# Patient Record
Sex: FEMALE | Race: WHITE | NOT HISPANIC OR LATINO | Employment: FULL TIME | URBAN - METROPOLITAN AREA
[De-identification: names, ages, dates, MRNs, and addresses within clinical notes are randomized per-mention and may not be internally consistent; named-entity substitution may affect disease eponyms.]

---

## 2019-10-02 ENCOUNTER — TRANSFERRED RECORDS (OUTPATIENT)
Dept: HEALTH INFORMATION MANAGEMENT | Facility: CLINIC | Age: 49
End: 2019-10-02

## 2019-10-23 ENCOUNTER — TRANSFERRED RECORDS (OUTPATIENT)
Dept: HEALTH INFORMATION MANAGEMENT | Facility: CLINIC | Age: 49
End: 2019-10-23

## 2020-01-22 ENCOUNTER — TRANSFERRED RECORDS (OUTPATIENT)
Dept: HEALTH INFORMATION MANAGEMENT | Facility: CLINIC | Age: 50
End: 2020-01-22

## 2020-11-13 ENCOUNTER — TRANSFERRED RECORDS (OUTPATIENT)
Dept: HEALTH INFORMATION MANAGEMENT | Facility: CLINIC | Age: 50
End: 2020-11-13

## 2020-11-22 ENCOUNTER — TRANSFERRED RECORDS (OUTPATIENT)
Dept: HEALTH INFORMATION MANAGEMENT | Facility: CLINIC | Age: 50
End: 2020-11-22

## 2020-12-07 ENCOUNTER — TRANSFERRED RECORDS (OUTPATIENT)
Dept: HEALTH INFORMATION MANAGEMENT | Facility: CLINIC | Age: 50
End: 2020-12-07

## 2020-12-11 ENCOUNTER — TRANSFERRED RECORDS (OUTPATIENT)
Dept: HEALTH INFORMATION MANAGEMENT | Facility: CLINIC | Age: 50
End: 2020-12-11

## 2020-12-14 ENCOUNTER — TRANSCRIBE ORDERS (OUTPATIENT)
Dept: OTHER | Age: 50
End: 2020-12-14

## 2020-12-14 DIAGNOSIS — N85.02 EIN (ENDOMETRIAL INTRAEPITHELIAL NEOPLASIA): Primary | ICD-10-CM

## 2020-12-15 ENCOUNTER — PATIENT OUTREACH (OUTPATIENT)
Dept: ONCOLOGY | Facility: CLINIC | Age: 50
End: 2020-12-15

## 2020-12-15 NOTE — PROGRESS NOTES
New Patient Oncology Nurse Navigator Note     Referring provider: Tara Lomax MD    Referring Clinic/Organization: Other - [facility]     Referred to: Gyn/Onc    Requested provider (if applicable): First available - did not specify . Prefers [place].    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 Followed up with endometrial biopsy done 12/7/20. Pathology showed endometrial intraepithelial neoplasia.     Clinical Assessment / Barriers to Care (Per Nurse):    Spoke with patient, no further questions at this time, ready for scheduling.       Records Location: [facility] Everywhere   Faxed - Media tab/Scanned     Records Needed:     None    Additional testing needed prior to consult:     None    Referral updates and Plan:     Referred on for scheduling.      Patria Tatum, RN, BSN, OCN    [facility]  [phone]

## 2020-12-16 ENCOUNTER — PRE VISIT (OUTPATIENT)
Dept: ONCOLOGY | Facility: CLINIC | Age: 50
End: 2020-12-16

## 2020-12-16 NOTE — TELEPHONE ENCOUNTER
ONCOLOGY INTAKE: Records Information      APPT INFORMATION:  Referring provider:  Dr. Lomax  Referring provider s clinic:  [facility]  Reason for visit/diagnosis:  EIN (endometrial intraepithelial neoplasia  Has patient been notified of appointment date and time?: yes    RECORDS INFORMATION:  Were the records received with the referral (via [employer])? no    Has patient been seen for any external appt for this diagnosis? yes    If yes, where? [facility]    Has patient had any imaging or procedures outside of [place]  [place] for this condition? yes      If Yes, where? [facility]    ADDITIONAL INFORMATION:  none

## 2020-12-17 NOTE — TELEPHONE ENCOUNTER
RECORDS STATUS - ALL OTHER DIAGNOSIS      RECORDS RECEIVED FROM: EIN (endometrial intraepithelial neoplasia   DATE RECEIVED: 12/17/2020   NOTES STATUS DETAILS   OFFICE NOTE from referring provider RECEIVED  DR ANTOINE FROM [facility]  12/11/2020   OFFICE NOTE from medical oncologist NA    DISCHARGE SUMMARY from hospital NA    DISCHARGE REPORT from the ER NA    OPERATIVE REPORT NA    MEDICATION LIST RECEIVED DR ANTOINE FROM [facility]  12/11/2020   CLINICAL TRIAL TREATMENTS TO DATE NA    LABS RECEIVED  [facility]   PATHOLOGY REPORTS RECEIVED  Report in chart 12/11/2020  Requesting slides  TRK:[id number]     ANYTHING RELATED TO DIAGNOSIS NA    GENONOMIC TESTING NA    TYPE:     IMAGING (NEED IMAGES & REPORT) In process    CT SCANS     MRI     MAMMO     ULTRASOUND     PET        Action    Action Taken SENT REQUEST TO [facility] FOR IMAGING AND REQUEST SLIDES FROM   [facility]

## 2020-12-18 NOTE — TELEPHONE ENCOUNTER
Nicole Song 12/18/20 1:48PM   Action Taken CSS received slides from [facility]- sent to Path dept 5th floor   CASE: [id number]

## 2020-12-22 PROCEDURE — 999N001032 HC STATISTIC REVIEW OUTSIDE SLIDES TC 88321: Performed by: OBSTETRICS & GYNECOLOGY

## 2020-12-22 PROCEDURE — 88321 CONSLTJ&REPRT SLD PREP ELSWR: CPT | Performed by: PATHOLOGY

## 2020-12-24 LAB — COPATH REPORT: NORMAL

## 2020-12-30 NOTE — TELEPHONE ENCOUNTER
Action    Action Taken 12/30/2020 2:48pm     I called [facility] to request IMG. Ph:  [phone] - They can't send image discs and they don't have any imaging on pt Mary Anne    I faxed over a request for IMG to [facility].     I called [facility]- her phone went to

## 2021-01-14 ENCOUNTER — ONCOLOGY VISIT (OUTPATIENT)
Dept: ONCOLOGY | Facility: CLINIC | Age: 51
End: 2021-01-14
Attending: OBSTETRICS & GYNECOLOGY
Payer: COMMERCIAL

## 2021-01-14 VITALS
BODY MASS INDEX: 39.67 KG/M2 | TEMPERATURE: 98.9 F | RESPIRATION RATE: 14 BRPM | WEIGHT: 215.6 LBS | HEIGHT: 62 IN | HEART RATE: 85 BPM | SYSTOLIC BLOOD PRESSURE: 109 MMHG | OXYGEN SATURATION: 99 % | DIASTOLIC BLOOD PRESSURE: 71 MMHG

## 2021-01-14 DIAGNOSIS — N85.00 ENDOMETRIAL HYPERPLASIA: Primary | ICD-10-CM

## 2021-01-14 PROCEDURE — 99205 OFFICE O/P NEW HI 60 MIN: CPT | Performed by: OBSTETRICS & GYNECOLOGY

## 2021-01-14 RX ORDER — MEDROXYPROGESTERONE ACETATE 10 MG
10 TABLET ORAL DAILY
COMMUNITY
Start: 2021-01-07 | End: 2024-05-23

## 2021-01-14 SDOH — HEALTH STABILITY: MENTAL HEALTH: HOW MANY STANDARD DRINKS CONTAINING ALCOHOL DO YOU HAVE ON A TYPICAL DAY?: 1 OR 2

## 2021-01-14 SDOH — HEALTH STABILITY: MENTAL HEALTH: HOW OFTEN DO YOU HAVE 6 OR MORE DRINKS ON ONE OCCASION?: NEVER

## 2021-01-14 SDOH — HEALTH STABILITY: MENTAL HEALTH: HOW OFTEN DO YOU HAVE A DRINK CONTAINING ALCOHOL?: MONTHLY OR LESS

## 2021-01-14 ASSESSMENT — PAIN SCALES - GENERAL: PAINLEVEL: NO PAIN (0)

## 2021-01-14 ASSESSMENT — MIFFLIN-ST. JEOR: SCORE: 1541.21

## 2021-01-14 NOTE — PROGRESS NOTES
"                        Consult Notes on Referred Patient    Date: 2021       Dr. Tara Lomax MD  [facility]  [address] [place] [address]       RE: Mary Anne Taylor  : 1970  LONI: 2021    Dear Dr. Tara Lomax:    I had the pleasure of seeing your patient Mary Anne Taylor here at the Gynecologic Cancer Clinic at the [facility] on 2021.  As you know she is a very pleasant 50 year old woman with a recent diagnosis of EIN.  Given these findings she was subsequently sent to the Gynecologic Cancer Clinic for new patient consultation.     Ms. Taylor is perimenopausal and presented in 10/2020 having had spotting after 6 months of amenorrhea.  During the evaluation of this she has a pap which demonstrated endometrial cells and a subsequent a biopsy which demonstrated EIN.      Her history is notable for a an DEBORAH pap in  (HPV neg) and ECC and EMbx at that time were WNL with the exception of some \"glandular crowding\". A follow-up biopsy was recommended and performed (2020) which was normal per report.        Review of Systems:    Systemic           no weight changes; no fever; no chills; no night sweats; no appetite changes  Skin           no rashes, or lesions  Eye           no irritation; no changes in vision  Clinton-Laryngeal           no dysphagia; no hoarseness   Pulmonary    no cough; no shortness of breath  Cardiovascular    no chest pain; no palpitations  Gastrointestinal    no diarrhea; no constipation; no abdominal pain; no changes in bowel  habits; no blood in stool  Genitourinary   no urinary frequency; no urinary urgency; no dysuria; no pain; no abnormal vaginal discharge; no abnormal vaginal bleeding  Breast   no breast discharge; no breast changes; no breast pain  Musculoskeletal    no myalgias; no arthralgias; no back pain  Psychiatric           no depressed mood; no anxiety    Hematologic           no tender lymph nodes; no " "noticeable swellings or lumps   Endocrine    no hot flashes; no heat/cold intolerance         Neurological   no tremor; no numbness and tingling; no headaches; no difficulty  sleeping      Past Medical History:    Colon polyps  Obesity  Elevated TSH (mild)    Past Surgical History:    Tubal liagtion    Health Maintenance:  Health Maintenance Due   Topic Date Due     PREVENTIVE CARE VISIT  1970     MAMMO SCREENING  1968     COLORECTAL CANCER SCREENING  1978     HIV SCREENING  1983     HEPATITIS C SCREENING  1986     DTAP/TDAP/TD IMMUNIZATION (1 - Tdap) 1993     LIPID  2013     ZOSTER IMMUNIZATION (1 of 2) 2018     PHQ-2  2021       Last Pap Smear: See above; also h/o KAYE 1 paps    Last Mammogram: 2019 BIRADS 1    Last Colonoscopy:  normal per report      Current Medications:     currently has no medications in their medication list.       Allergies:      Not on File         Social History:     Social History     Tobacco Use     Smoking status: Not on file   Substance Use Topics     Alcohol use: Not on file       History   Drug Use Not on file           Family History:     The patient's family history is notable for .    Father with colon CA (deceeased at age 59), brother with testis CA ( at age 51)    Physical Exam:     PS 0  VS: /71 (BP Location: Right arm)   Pulse 85   Temp 98.9  F (37.2  C) (Oral)   Resp 14   Ht 1.575 m (5' 2\")   Wt 97.8 kg (215 lb 9.6 oz)   SpO2 99%   BMI 39.43 kg/m       General Appearance: healthy and alert, no distress     HEENT:  no thyromegaly, no palpable nodules or masses        Cardiovascular: regular rate and rhythm, no gallops, rubs or murmurs     Respiratory: lungs clear, no rales, rhonchi or wheezes, normal diaphragmatic excursion    Musculoskeletal: extremities non tender and without edema    Skin: no lesions or rashes     Neurological: normal gait, no gross defects     Psychiatric: appropriate mood and " affect                               Hematological: normal cervical, supraclavicular and inguinal lymph nodes     Gastrointestinal:       abdomen soft, non-tender, non-distended, no organomegaly or masses    Genitourinary: External genitalia and urethral meatus appears normal.  Vagina is smooth without nodularity or masses.  Cervix appears normal and without lesions.  Bimanual exam reveal no masses, nodularity or fullness.  Recto-vaginal exam confirms these findings.      Assessment:    Mary Anne Taylor is a 50 year old woman with a new diagnosis of EIN.     A total of 60 minutes was spent with the patient, 40 minutes of which were spent in counseling the patient and/or treatment planning.      Plan:     1.)   EIN - CAH: we have discussed the pathologic an clinical findings and she is aware of the potential for occult malignancy.  We have discussed options including medical and surgical management. After a comprehensive discussion of the relative risks and benefits of each strategy she is inclined to consider her options further prior to making a definitive decision.  This is reasonable.  She will continue to take her progestin and will make a plan to F/U in 3 months for a biopsy on the assumption that she elects away from surgery.  She is aware she may return at any point should her symptoms worsen or she simply changes her mind.  She is not a candidate for the exemestane study owing to her previous exposure to progestins.  I have discussed the potential risks and benefits of ovarian removal at the time of hysterectomy and she is inclined to TLH/BSO, with staging as indicated by the intra-operative findings.      2.) Genetic risk factors were assessed and the patient does not meet the qualifications for a referral.      3.) Labs and/or tests ordered include:  Pre-operative labs.     4.) Health maintenance issues addressed today include none.          Thank you for allowing us to participate in the care of your  patient.         Sincerely,    Wilfredo Landeros MD      CC  No care team member to display  ROSIO ANTOINE

## 2021-01-14 NOTE — NURSING NOTE
"Oncology Rooming Note    January 14, 2021 12:12 PM   Mary Anne Taylor is a 50 year old female who presents for:    Chief Complaint   Patient presents with     Oncology Clinic Visit     New Patient     Initial Vitals: /71 (BP Location: Right arm)   Pulse 85   Temp 98.9  F (37.2  C) (Oral)   Resp 14   Ht 1.575 m (5' 2\")   Wt 97.8 kg (215 lb 9.6 oz)   SpO2 99%   BMI 39.43 kg/m   Estimated body mass index is 39.43 kg/m  as calculated from the following:    Height as of this encounter: 1.575 m (5' 2\").    Weight as of this encounter: 97.8 kg (215 lb 9.6 oz). Body surface area is 2.07 meters squared.  No Pain (0) Comment: Data Unavailable   No LMP recorded.  Allergies reviewed: Yes  Medications reviewed: Yes    Medications: Medication refills not needed today.  Pharmacy name entered into [employer]: [id number] [address]    Clinical concerns: None       Kina Munoz CMA            "

## 2021-01-14 NOTE — PATIENT INSTRUCTIONS
Follow-up with Dr. Landeros in 3 months with endometrial biopsy.      Follow-up sooner with any worsening symptoms, or if you decide to proceed with surgery.

## 2021-01-15 ENCOUNTER — HEALTH MAINTENANCE LETTER (OUTPATIENT)
Age: 51
End: 2021-01-15

## 2021-04-21 PROBLEM — F17.200 TOBACCO USE DISORDER: Status: ACTIVE | Noted: 2017-11-03

## 2021-04-21 PROBLEM — E03.8 SUBCLINICAL HYPOTHYROIDISM: Status: ACTIVE | Noted: 2020-11-21

## 2021-04-21 PROBLEM — Z98.51 STATUS POST TUBAL LIGATION: Status: ACTIVE | Noted: 2017-11-03

## 2021-04-21 NOTE — PROGRESS NOTES
"                        Consult Notes on Referred Patient    Dr. Tara Lomax MD  [facility]  [address] [place] [address]       RE: Mary Anne Taylor  : 1970  LONI: 2021     Dear Dr. Tara Lomax:    I had the pleasure of seeing your patient Mary Anne Taylor here at the Gynecologic Cancer Clinic at the [facility] on 2021.  As you know she is a very pleasant 50 year old woman with a recent diagnosis of EIN.  Given these findings she was subsequently sent to the Gynecologic Cancer Clinic for new patient consultation.     Ms. Taylor is perimenopausal and presented in 10/2020 having had spotting after 6 months of amenorrhea.  During the evaluation of this she has a pap which demonstrated endometrial cells and a subsequent a biopsy which demonstrated EIN.      Her history is notable for a an DEBORAH pap in  (HPV neg) and ECC and EMbx at that time were WNL with the exception of some \"glandular crowding\". A follow-up biopsy was recommended and performed (2020) which was normal per report.      She elected to procedure with progesterone therapy which she took daily for 2 months but has been off for about a month at this point \"I thought that was all I should take.\"  She presents in follow-up and reports no interval bleeding.  She has not been sexually active during this time      Review of Systems:    Systemic           no weight changes; no fever; no chills; no night sweats; no appetite changes  Skin           no rashes, or lesions  Eye           no irritation; no changes in vision  Clinton-Laryngeal           no dysphagia; no hoarseness   Pulmonary    no cough; no shortness of breath  Cardiovascular    no chest pain; no palpitations  Gastrointestinal    no diarrhea; no constipation; no abdominal pain; no changes in bowel  habits; no blood in stool  Genitourinary   no urinary frequency; no urinary urgency; no dysuria; no pain; no abnormal vaginal " "discharge; no abnormal vaginal bleeding  Breast   no breast discharge; no breast changes; no breast pain  Musculoskeletal    no myalgias; no arthralgias; no back pain  Psychiatric           no depressed mood; no anxiety    Hematologic           no tender lymph nodes; no noticeable swellings or lumps   Endocrine    no hot flashes; no heat/cold intolerance         Neurological   no tremor; no numbness and tingling; no headaches; no difficulty  sleeping      Past Medical History:    Colon polyps  Obesity  Elevated TSH (mild)    Past Surgical History:    Tubal liagtion    Health Maintenance:  Health Maintenance Due   Topic Date Due     PREVENTIVE CARE VISIT  Never done     ADVANCE CARE PLANNING  Never done     MAMMO SCREENING  Never done     COLORECTAL CANCER SCREENING  Never done     HIV SCREENING  Never done     COVID-19 Vaccine (1) Never done     HEPATITIS C SCREENING  Never done     LIPID  Never done     ZOSTER IMMUNIZATION (1 of 2) Never done     PHQ-2  Never done       Last Pap Smear: See above; also h/o KAYE 1 paps    Last Mammogram: 2019 BIRADS 1    Last Colonoscopy:  normal per report      Current Medications:     has a current medication list which includes the following prescription(s): medroxyprogesterone.       Allergies:      No Known Allergies         Social History:     Social History     Tobacco Use     Smoking status: Former Smoker     Smokeless tobacco: Never Used   Substance Use Topics     Alcohol use: Yes     Frequency: Monthly or less     Drinks per session: 1 or 2     Binge frequency: Never     Comment: mixed drinks only half a glass       History   Drug Use Unknown           Family History:     The patient's family history is notable for .    Father with colon CA (deceeased at age 59), brother with testis CA ( at age 51)    Physical Exam:     PS 0  VS: /76 (BP Location: Right arm)   Pulse 76   Temp 98.6  F (37  C) (Oral)   Resp 18   Ht 1.575 m (5' 2.01\")   Wt 91.9 kg (202 " lb 11.2 oz)   SpO2 98%   BMI 37.06 kg/m        General Appearance: healthy and alert, no distress     HEENT:  no thyromegaly, no palpable nodules or masses        Cardiovascular: regular rate and rhythm, no gallops, rubs or murmurs     Respiratory: lungs clear, no rales, rhonchi or wheezes, normal diaphragmatic excursion    Musculoskeletal: extremities non tender and without edema    Skin: no lesions or rashes     Neurological: normal gait, no gross defects     Psychiatric: appropriate mood and affect                               Hematological: normal cervical, supraclavicular and inguinal lymph nodes     Gastrointestinal:       abdomen soft, non-tender, non-distended, no organomegaly or masses    Genitourinary: External genitalia and urethral meatus appears normal.  Vagina is smooth without nodularity or masses.  Cervix appears normal and without lesions.  Bimanual exam reveal no masses, nodularity or fullness.  Recto-vaginal exam confirms these findings.      Procedure: Sterile Pipelle ndometrial biopsy performed without dilation, productive of a higher than expected tissue volume, patient tolerated the procedure well      Assessment:    Mary Anne Taylor is a 50 year old woman with a new diagnosis of EIN.     A total of 60 minutes was spent with the patient, 40 minutes of which were spent in counseling the patient and/or treatment planning.      Plan:     1.)   EIN - CAH: we have discussed the pathologic an clinical findings and she is aware of the potential for occult malignancy.  We have discussed options including medical and surgical management. She is inclined to wait for the current biopsy report prior to making a definitive decision, which is reasonable.  WE should have this back in about 1 week     2.) Genetic risk factors were assessed and the patient does not meet the qualifications for a referral.      3.) Labs and/or tests ordered include:  biopsy     4.) Health maintenance issues addressed today  include none.      Thank you for allowing us to participate in the care of your patient.         Sincerely,    Wilfredo Landeros MD      CC  Patient Care Team:  No Ref-Primary, Physician as PCP - General  Wilfredo Landeros MD as Assigned Cancer Care Provider  ROSIO ANTOINE

## 2021-04-22 ENCOUNTER — ONCOLOGY VISIT (OUTPATIENT)
Dept: ONCOLOGY | Facility: CLINIC | Age: 51
End: 2021-04-22
Attending: OBSTETRICS & GYNECOLOGY
Payer: COMMERCIAL

## 2021-04-22 VITALS
SYSTOLIC BLOOD PRESSURE: 114 MMHG | HEART RATE: 76 BPM | WEIGHT: 202.7 LBS | HEIGHT: 62 IN | DIASTOLIC BLOOD PRESSURE: 76 MMHG | RESPIRATION RATE: 18 BRPM | TEMPERATURE: 98.6 F | OXYGEN SATURATION: 98 % | BODY MASS INDEX: 37.3 KG/M2

## 2021-04-22 DIAGNOSIS — N85.00 ENDOMETRIAL HYPERPLASIA: ICD-10-CM

## 2021-04-22 PROCEDURE — 88305 TISSUE EXAM BY PATHOLOGIST: CPT | Performed by: OBSTETRICS & GYNECOLOGY

## 2021-04-22 PROCEDURE — 99214 OFFICE O/P EST MOD 30 MIN: CPT | Mod: 25 | Performed by: OBSTETRICS & GYNECOLOGY

## 2021-04-22 PROCEDURE — 58100 BIOPSY OF UTERUS LINING: CPT | Performed by: OBSTETRICS & GYNECOLOGY

## 2021-04-22 ASSESSMENT — MIFFLIN-ST. JEOR: SCORE: 1482.82

## 2021-04-22 ASSESSMENT — PAIN SCALES - GENERAL: PAINLEVEL: NO PAIN (0)

## 2021-04-22 NOTE — LETTER
"    2021         RE: Mary Anne Taylor  [address] [place] [address]        Dear Colleague,    Thank you for referring your patient, Mary Anne Taylor, to the [facility]. Please see a copy of my visit note below.                            Consult Notes on Referred Patient    [facility]  [address] [place] [address]       RE: Mary Anne Taylor  : 1970  LONI: 2021     Dear Dr. Tara Lomax:    I had the pleasure of seeing your patient Mary Anne Taylor here at the Gynecologic Cancer Clinic at the [facility] on 2021.  As you know she is a very pleasant 50 year old woman with a recent diagnosis of EIN.  Given these findings she was subsequently sent to the Gynecologic Cancer Clinic for new patient consultation.     Ms. Taylor is perimenopausal and presented in 10/2020 having had spotting after 6 months of amenorrhea.  During the evaluation of this she has a pap which demonstrated endometrial cells and a subsequent a biopsy which demonstrated EIN.      Her history is notable for a an DEBORAH pap in  (HPV neg) and ECC and EMbx at that time were WNL with the exception of some \"glandular crowding\". A follow-up biopsy was recommended and performed (2020) which was normal per report.      She elected to procedure with progesterone therapy which she took daily for 2 months but has been off for about a month at this point \"I thought that was all I should take.\"  She presents in follow-up and reports no interval bleeding.  She has not been sexually active during this time      Review of Systems:    Systemic           no weight changes; no fever; no chills; no night sweats; no appetite changes  Skin           no rashes, or lesions  Eye           no irritation; no changes in vision  Clinton-Laryngeal           no dysphagia; no hoarseness   Pulmonary    no cough; no shortness of " breath  Cardiovascular    no chest pain; no palpitations  Gastrointestinal    no diarrhea; no constipation; no abdominal pain; no changes in bowel  habits; no blood in stool  Genitourinary   no urinary frequency; no urinary urgency; no dysuria; no pain; no abnormal vaginal discharge; no abnormal vaginal bleeding  Breast   no breast discharge; no breast changes; no breast pain  Musculoskeletal    no myalgias; no arthralgias; no back pain  Psychiatric           no depressed mood; no anxiety    Hematologic           no tender lymph nodes; no noticeable swellings or lumps   Endocrine    no hot flashes; no heat/cold intolerance         Neurological   no tremor; no numbness and tingling; no headaches; no difficulty  sleeping      Past Medical History:    Colon polyps  Obesity  Elevated TSH (mild)    Past Surgical History:    Tubal liagtion    Health Maintenance:  Health Maintenance Due   Topic Date Due     PREVENTIVE CARE VISIT  Never done     ADVANCE CARE PLANNING  Never done     MAMMO SCREENING  Never done     COLORECTAL CANCER SCREENING  Never done     HIV SCREENING  Never done     COVID-19 Vaccine (1) Never done     HEPATITIS C SCREENING  Never done     LIPID  Never done     ZOSTER IMMUNIZATION (1 of 2) Never done     PHQ-2  Never done       Last Pap Smear: See above; also h/o KAYE 1 paps    Last Mammogram: 5/2019 BIRADS 1    Last Colonoscopy: 2018 normal per report      Current Medications:     has a current medication list which includes the following prescription(s): medroxyprogesterone.       Allergies:      No Known Allergies         Social History:     Social History     Tobacco Use     Smoking status: Former Smoker     Smokeless tobacco: Never Used   Substance Use Topics     Alcohol use: Yes     Frequency: Monthly or less     Drinks per session: 1 or 2     Binge frequency: Never     Comment: mixed drinks only half a glass       History   Drug Use Unknown           Family History:     The patient's family history  "is notable for .    Father with colon CA (deceeased at age 59), brother with testis CA ( at age 51)    Physical Exam:     PS 0  VS: /76 (BP Location: Right arm)   Pulse 76   Temp 98.6  F (37  C) (Oral)   Resp 18   Ht 1.575 m (5' 2.01\")   Wt 91.9 kg (202 lb 11.2 oz)   SpO2 98%   BMI 37.06 kg/m        General Appearance: healthy and alert, no distress     HEENT:  no thyromegaly, no palpable nodules or masses        Cardiovascular: regular rate and rhythm, no gallops, rubs or murmurs     Respiratory: lungs clear, no rales, rhonchi or wheezes, normal diaphragmatic excursion    Musculoskeletal: extremities non tender and without edema    Skin: no lesions or rashes     Neurological: normal gait, no gross defects     Psychiatric: appropriate mood and affect                               Hematological: normal cervical, supraclavicular and inguinal lymph nodes     Gastrointestinal:       abdomen soft, non-tender, non-distended, no organomegaly or masses    Genitourinary: External genitalia and urethral meatus appears normal.  Vagina is smooth without nodularity or masses.  Cervix appears normal and without lesions.  Bimanual exam reveal no masses, nodularity or fullness.  Recto-vaginal exam confirms these findings.      Procedure: Sterile Pipelle ndometrial biopsy performed without dilation, productive of a higher than expected tissue volume, patient tolerated the procedure well      Assessment:    Mary Anne Taylor is a 50 year old woman with a new diagnosis of EIN.     A total of 60 minutes was spent with the patient, 40 minutes of which were spent in counseling the patient and/or treatment planning.      Plan:     1.)   EIN - CAH: we have discussed the pathologic an clinical findings and she is aware of the potential for occult malignancy.  We have discussed options including medical and surgical management. She is inclined to wait for the current biopsy report prior to making a definitive decision, " which is reasonable.  WE should have this back in about 1 week     2.) Genetic risk factors were assessed and the patient does not meet the qualifications for a referral.      3.) Labs and/or tests ordered include:  biopsy     4.) Health maintenance issues addressed today include none.      Thank you for allowing us to participate in the care of your patient.         Sincerely,    Wilfredo Landeros MD        Patient Care Team:  No Ref-Primary, Physician as PCP - General  Wilfredo Landeros MD as Assigned Cancer Care Provider  ROSIO ANTOINE        Again, thank you for allowing me to participate in the care of your patient.        Sincerely,        Wilfredo Landeros MD

## 2021-04-22 NOTE — PATIENT INSTRUCTIONS
Endometrial biopsy was done today, we will notify you when the results come in.  Follow up based on results.

## 2021-04-27 LAB — COPATH REPORT: NORMAL

## 2021-04-29 ENCOUNTER — TELEPHONE (OUTPATIENT)
Dept: ONCOLOGY | Facility: CLINIC | Age: 51
End: 2021-04-29

## 2021-04-29 NOTE — TELEPHONE ENCOUNTER
"Wants to know:  - biopsy results from 4/22/21  - if she is reading the MyChart results correctly that say \"negative for malignancy.\"  - what to do from here. Three month follow up as previously discussed?    Paged Dr Gallagher whom states:  -every thing is good. No concerns  -follow up in 6 months with biopsy    Informed patient of results and plan of care.  Patient grateful for these results.  Transferred patient to  to schedule 6 month appointment.  Informed patient to contact us sooner than 6 months if any new, or worsening, concerns arise.    Yadira Lockwood RN on 4/29/2021 at 2:07 PM    "

## 2021-10-11 ENCOUNTER — HEALTH MAINTENANCE LETTER (OUTPATIENT)
Age: 51
End: 2021-10-11

## 2021-11-09 ENCOUNTER — ONCOLOGY VISIT (OUTPATIENT)
Dept: ONCOLOGY | Facility: CLINIC | Age: 51
End: 2021-11-09
Payer: COMMERCIAL

## 2021-11-09 VITALS
RESPIRATION RATE: 16 BRPM | HEIGHT: 62 IN | SYSTOLIC BLOOD PRESSURE: 122 MMHG | BODY MASS INDEX: 36.22 KG/M2 | HEART RATE: 67 BPM | DIASTOLIC BLOOD PRESSURE: 75 MMHG | TEMPERATURE: 98.7 F | OXYGEN SATURATION: 98 % | WEIGHT: 196.8 LBS

## 2021-11-09 DIAGNOSIS — N85.00 ENDOMETRIAL HYPERPLASIA: Primary | ICD-10-CM

## 2021-11-09 PROCEDURE — 58100 BIOPSY OF UTERUS LINING: CPT | Performed by: OBSTETRICS & GYNECOLOGY

## 2021-11-09 PROCEDURE — 99213 OFFICE O/P EST LOW 20 MIN: CPT | Mod: 25 | Performed by: OBSTETRICS & GYNECOLOGY

## 2021-11-09 PROCEDURE — 88305 TISSUE EXAM BY PATHOLOGIST: CPT | Performed by: PATHOLOGY

## 2021-11-09 ASSESSMENT — MIFFLIN-ST. JEOR: SCORE: 1451.09

## 2021-11-09 ASSESSMENT — PAIN SCALES - GENERAL: PAINLEVEL: NO PAIN (0)

## 2021-11-09 NOTE — PROGRESS NOTES
"Gynecologic Oncology Clinic - Established Patient Visit    Visit date: Nov 9, 2021     CC: f/u EIN    Interval history: Mary Anne Taylor is a 51 year old pre-menopausal female with past history of EIN treated with short course of oral progestin who returns for follow-up. Her last biopsy in 4/2021 was negative for hyperplasia. She continues to have regular menses. The longest she has gone without bleeding is 2 months. No inter-menstrual bleeding. She is still contemplating hysterectomy. She is prepared for repeat biopsy today.       Review of Systems:  As per HPI, otherwise non-contributory.     Past Medical History:  Past Medical History:   Diagnosis Date     Adenomatous polyp      Hypothyroidism        Past Surgical History:  Past Surgical History:   Procedure Laterality Date     TUBAL LIGATION          Physical Exam:  /75 (BP Location: Right arm, Patient Position: Sitting, Cuff Size: Adult Large)   Pulse 67   Temp 98.7  F (37.1  C) (Oral)   Resp 16   Ht 1.575 m (5' 2.01\")   Wt 89.3 kg (196 lb 12.8 oz)   SpO2 98%   BMI 35.98 kg/m     General appearance: no acute distress, well groomed, sitting comfortably   :  External: vulva/labia normal in appearance without lesions  Vagina: mucosa is pink, no lesions appreciated  Cervix: normal appearing without lesions  Uterus/adnexa: Normal sized    Endometrial biopsy  Cervix visualized. Attempted to pass pipelle. Single-tooth tenaculum placed on anterior lip, pipelle passed through cervix. Biopsy obtained with moderate cramping. Minimal tissue. Will await results.     Labs/Pathology:  Pending  Reviewed [facility] pathology [facility] endometrial biopsy.    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    Plan:  - Repeat endometrial biopsy obtained today. Will make follow-up " plan pending results.We did discuss option of hysterectomy given her history of EIN, but I would want to ensure pre-surgical insurance approval in that case.    In addition to biopsy I spent a total of 15 min in discussion of her history and options for management.     Jass Dodge MD     Gynecologic Oncology

## 2021-11-09 NOTE — NURSING NOTE
"Oncology Rooming Note    November 9, 2021 2:28 PM   Mary Anne Taylor is a 51 year old female who presents for:    Chief Complaint   Patient presents with     Oncology Clinic Visit     6 month follow up exam/repeat biopsy     Initial Vitals: /75 (BP Location: Right arm, Patient Position: Sitting, Cuff Size: Adult Large)   Pulse 67   Temp 98.7  F (37.1  C) (Oral)   Resp 16   Ht 1.575 m (5' 2.01\")   Wt 89.3 kg (196 lb 12.8 oz)   SpO2 98%   BMI 35.98 kg/m   Estimated body mass index is 35.98 kg/m  as calculated from the following:    Height as of this encounter: 1.575 m (5' 2.01\").    Weight as of this encounter: 89.3 kg (196 lb 12.8 oz). Body surface area is 1.98 meters squared.  No Pain (0) Comment: Data Unavailable   No LMP recorded. Patient is postmenopausal.  Allergies reviewed: Yes  Medications reviewed: Yes    Medications: Medication refills not needed today.  Pharmacy name entered into [employer]: [id number] [place] [facility] [place], [place] - [address]    Clinical concerns:  Irregular vaginal bleeding, no bleeding from June to August and then off/on bleeding Sept/Oct  Dr. Dodge advised      Kina Munoz CMA            "

## 2021-11-09 NOTE — LETTER
"    11/9/2021         RE: Mary Anne Taylor  [address] [facility] [address] [facility] [address]        Dear Colleague,    Thank you for referring your patient, Mary Anne Taylor, to the [facility]. Please see a copy of my visit note below.    Gynecologic Oncology Clinic - Established Patient Visit    Visit date: Nov 9, 2021     CC: f/u EIN    Interval history: Mary Anne Taylor is a 51 year old pre-menopausal female with past history of EIN treated with short course of oral progestin who returns for follow-up. Her last biopsy in 4/2021 was negative for hyperplasia. She continues to have regular menses. The longest she has gone without bleeding is 2 months. No inter-menstrual bleeding. She is still contemplating hysterectomy. She is prepared for repeat biopsy today.       Review of Systems:  As per HPI, otherwise non-contributory.     Past Medical History:  Past Medical History:   Diagnosis Date     Adenomatous polyp      Hypothyroidism        Past Surgical History:  Past Surgical History:   Procedure Laterality Date     TUBAL LIGATION          Physical Exam:  /75 (BP Location: Right arm, Patient Position: Sitting, Cuff Size: Adult Large)   Pulse 67   Temp 98.7  F (37.1  C) (Oral)   Resp 16   Ht 1.575 m (5' 2.01\")   Wt 89.3 kg (196 lb 12.8 oz)   SpO2 98%   BMI 35.98 kg/m     General appearance: no acute distress, well groomed, sitting comfortably   :  External: vulva/labia normal in appearance without lesions  Vagina: mucosa is pink, no lesions appreciated  Cervix: normal appearing without lesions  Uterus/adnexa: Normal sized    Endometrial biopsy  Cervix visualized. Attempted to pass pipelle. Single-tooth tenaculum placed on anterior lip, pipelle passed through cervix. Biopsy obtained with moderate cramping. Minimal tissue. Will await results.     Labs/Pathology:  Pending  Reviewed [facility] pathology [facility] endometrial biopsy.    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    Plan:  - Repeat endometrial biopsy obtained today. Will make follow-up plan pending results.We did discuss option of hysterectomy given her history of EIN, but I would want to ensure pre-surgical insurance approval in that case.    In addition to biopsy I spent a total of 15 min in discussion of her history and options for management.     Jass Dodge MD     Gynecologic Oncology         Again, thank you for allowing me to participate in the care of your patient.        Sincerely,        Jass Dodge MD

## 2021-11-16 LAB
PATH REPORT.COMMENTS IMP SPEC: NORMAL
PATH REPORT.COMMENTS IMP SPEC: NORMAL
PATH REPORT.FINAL DX SPEC: NORMAL
PATH REPORT.GROSS SPEC: NORMAL
PATH REPORT.MICROSCOPIC SPEC OTHER STN: NORMAL
PATH REPORT.RELEVANT HX SPEC: NORMAL
PHOTO IMAGE: NORMAL

## 2022-01-30 ENCOUNTER — HEALTH MAINTENANCE LETTER (OUTPATIENT)
Age: 52
End: 2022-01-30

## 2022-09-24 ENCOUNTER — HEALTH MAINTENANCE LETTER (OUTPATIENT)
Age: 52
End: 2022-09-24

## 2023-01-30 ENCOUNTER — HEALTH MAINTENANCE LETTER (OUTPATIENT)
Age: 53
End: 2023-01-30

## 2023-05-08 ENCOUNTER — HEALTH MAINTENANCE LETTER (OUTPATIENT)
Age: 53
End: 2023-05-08

## 2023-05-21 NOTE — NURSING NOTE
"Oncology Rooming Note    April 22, 2021 11:10 AM   Mary Anne Taylor is a 50 year old female who presents for:    Chief Complaint   Patient presents with     Oncology Clinic Visit     3 month follow up     Initial Vitals: /76 (BP Location: Right arm)   Pulse 76   Temp 98.6  F (37  C) (Oral)   Resp 18   Ht 1.575 m (5' 2.01\")   Wt 91.9 kg (202 lb 11.2 oz)   SpO2 98%   BMI 37.06 kg/m   Estimated body mass index is 37.06 kg/m  as calculated from the following:    Height as of this encounter: 1.575 m (5' 2.01\").    Weight as of this encounter: 91.9 kg (202 lb 11.2 oz). Body surface area is 2.01 meters squared.  No Pain (0) Comment: Data Unavailable   No LMP recorded. Patient is postmenopausal.  Allergies reviewed: Yes  Medications reviewed: Yes    Medications: Medication refills not needed today.  Pharmacy name entered into "[employer]: [id number] [place] [facility] [place] - [address]      Alecia Moss LPN              " [facility] Medicine  Discharge Summary      Patient Name: Earl Abdul  MRN: [id number]  IZA: [id number]  Patient Class: IP- Inpatient  Admission Date: 5/15/2023  Hospital Length of Stay: 6 days  Discharge Date and Time:  05/21/2023 12:54 PM  Attending Physician: Luis Smith MD   Discharging Provider: King Wallace MD  Primary Care Provider: Andrew Rodriguez MD  Hospital Medicine Team: [facility] 1 King Wallace MD  Primary Care Team: [facility] MED 1    HPI:   62 year old female with medical history significant for  EtOH use disorder with history of prior seizures, depression with suicide attempt in 2017, breast cancer, HTN, HLD, fibromyalgia, sarcoidosis, hypothyroidism, T2DM, CLL (not on treatment) and COPD presenting with complaints of nausea and vomiting x 2 days associated with generalized weakness. She reports the last time she had a meal was 1 week ago but she has been drinking EtOH very heavily. She reports drinking 0.5 bottles of vodka daily and her last drink was this morning. Per EMS, her BG was reportedly 55 prior to arrival. She states that she has had diarrhea as well. She has had alcohol withdrawals including seizures before. She was recently admitted 4/25-4/20 for the same presentation of nausea and vomiting in setting of 2 weeks of heavy alcohol use. She was initaited on CIWA and treated with valium taper, no seizures during that admission. 3 weeks prior to that she was admitted for acute on chronic respiratory failure due to COPD with non-adherance to home inhalers. She also had a mechanical fall 2 days ago in which she hurt her eye lid. Denies fevers, chest pain, hematemesis or bleeding per rectum.     On arrival to [facility], she is AF, , /96, on 4L NC. WBC 20.11 (baseline tends to fluctuate between leukopenia and leukocytosis). Hgb 11.1, Plt 149, both improved from baseline. Na 131, K 5.1, Cl 90, HCO3 15, BUN 34, Cr 2.2 (bl 0.7). Glucose 46 on arrival, which  improved to 234 with administration of D10. Lactate 2.5. CXR without acute cardiopulmonary process.       Hospital Course:   Patient admitted with Alcoholic ketoacidosis and symptomatic Alcohol withdrawal. The patient was started on a Valium taper with IV ativan PRN in place for CIWA >8. She continued to be nauseous, have multiple episodes of emesis, and complain of a headache for the first two days of admission, thus required extra PRN IV ativan doses. Fluid resuscitated. Urine Cx grew Klebsiella and pt was given 3 days of IV Ceftriaxone. Improvement in withdrawal symptoms noted with continued benzodiazepine taper. Evaluated by Addiction psychiatry and is planning to go to Rehab on discharge. Counseled on the need to stop drinking. Prescribed Naltrexone and multivitamins prior to discharge. Follow up with Oncology for CLL and Addiction Psychiatry for EtOH use disorder.        Goals of Care Treatment Preferences:  Code Status: Full Code    Health care agent: Spouse is RAFK  Health care agent number: No value filed.          What is most important right now is to focus on curative/life-prolongation (regardless of treatment burdens).  Accordingly, we have decided that the best plan to meet the patient's goals includes continuing with treatment.      Consults:   Consults (From admission, onward)          Status Ordering Provider     Inpatient consult to Midline team  Once        Provider:  (Not yet assigned)    Completed LAXMI MARADIAGA     Inpatient consult to Midline team  Once        Provider:  (Not yet assigned)    Completed ISAAC MIGUEL     Inpatient consult to Psychiatry  Once        Provider:  (Not yet assigned)    Completed EDEN CALDWELL          Vitals:    05/21/23 0041 05/21/23 0435 05/21/23 0731 05/21/23 1142   BP: (!) 109/55 (!) 104/56 (!) 113/57 (!) 106/58   BP Location: Right arm Right arm     Patient Position: Lying Lying     Pulse: 76 77 80 81   Resp: 18 18 14 14   Temp: 97.5 °F (36.4 °C) 97.6  "°F (36.4 °C) 96.4 °F (35.8 °C) 98.1 °F (36.7 °C)   TempSrc: Oral Oral     SpO2: (!) 93% (!) 91% (!) 93% (!) 91%   Weight:       Height:               Final Active Diagnoses:    Diagnosis Date Noted POA    PRINCIPAL PROBLEM:  Alcoholic ketoacidosis [E87.29] 04/29/2022 Yes    Alcohol use disorder, severe, dependence [F10.20] 02/07/2014 Yes     Chronic    CLL (chronic lymphocytic leukemia) [C91.10] 09/30/2022 Yes    Malignant neoplasm of central portion of right breast in female, estrogen receptor positive [C50.111, Z17.0] 11/18/2020 Not Applicable    DEVANTE (acute kidney injury) [N17.9] 10/14/2014 Yes    Essential hypertension [I10] 02/07/2014 Yes    Fibromyalgia [M79.7] 02/07/2014 Yes    Hypomagnesemia [E83.42] 10/19/2014 Yes    Hyperlipidemia [E78.5] 11/30/2012 Yes    Hypothyroidism [E03.9] 11/30/2021 Yes    Type 2 diabetes mellitus with hyperglycemia [E11.65] 11/30/2021 Yes    COPD (chronic obstructive pulmonary disease) [J44.9] 04/17/2021 Yes    Depression [F32.A] 04/26/2023 Yes    Obesity (BMI 30.0-34.9) [E66.9] 11/30/2021 Yes    Alcohol withdrawal syndrome with complication [F10.939] 02/07/2014 Yes    Sarcoidosis [D86.9] 03/31/2011 Yes      Problems Resolved During this Admission:       Discharged Condition: stable    Follow Up:   Follow-up Information       Andrew Rodriguez MD Follow up in 1 week(s).    Specialty: Internal Medicine  Contact information:  [address]  [facility] [id number] [phone]                           Patient Instructions:      WALKER FOR HOME USE     Order Specific Question Answer Comments   Type of Walker: Adult (5'4"-6'6")    With wheels? Yes    Height: 5' 6" (1.676 m)    Weight: 75.8 kg (167 lb 1.7 oz)    Length of need (1-99 months): 99    Does patient have medical equipment at home? CPAP    Does patient have medical equipment at home? shower chair    Please check all that apply: Patient's condition impairs ambulation.    Please check all that apply: Patient is " unable to safely ambulate without equipment.      Ambulatory referral/consult to [facility]   Standing Status: Future   Referral Priority: Routine Referral Type: Consultation   Referral Reason: Specialty Services Required   Number of Visits Requested: 1     Ambulatory referral/consult to Addiction Psychiatry   Standing Status: Future   Referral Priority: Routine Referral Type: Psychiatric   Referral Reason: Specialty Services Required   Requested Specialty: Addiction Medicine   Number of Visits Requested: 1     Ambulatory referral/consult to Hematology / Oncology   Standing Status: Future   Referral Priority: Routine Referral Type: Consultation   Referral Reason: Specialty Services Required   Requested Specialty: Hematology and Oncology   Number of Visits Requested: 1       Significant Diagnostic Studies: Labs:   BMP:   Recent Labs   Lab 05/19/23  1312 05/20/23  0502 05/21/23  0405   GLU 95 102 140*   * 137 136   K 5.1 4.3 4.7    100 104   CO2 23 28 24   BUN <3* 4* 5*   CREATININE 0.7 0.8 0.8   CALCIUM 8.8 8.8 9.0   MG  --  1.3* 1.6    and CBC   Recent Labs   Lab 05/19/23  1312 05/20/23  0502 05/21/23  0543   WBC 3.80* 4.22 3.76*   HGB 10.7* 10.3* 11.0*   HCT 35.8* 33.9* 36.2*   PLT 59* 69* 71*       Pending Diagnostic Studies:       None           Medications:  Reconciled Home Medications:      Medication List        START taking these medications      multivitamin Tab  Take 1 tablet by mouth once daily.  Start taking on: May 22, 2023     naltrexone 50 mg tablet  Commonly known as: DEPADE  Take 1 tablet (50 mg) by mouth once daily.  Start taking on: May 22, 2023            CHANGE how you take these medications      DULoxetine 30 MG capsule  Commonly known as: CYMBALTA  Take 3 capsules (90 mg total) by mouth once daily.  Start taking on: May 22, 2023  What changed:   how much to take  additional instructions  Another medication with the same name was removed. Continue  taking this medication, and follow the directions you see here.            CONTINUE taking these medications      acetaminophen 500 MG tablet  Commonly known as: TYLENOL  Take 500-1,500 mg by mouth daily as needed for Pain.     folic acid 1 MG tablet  Commonly known as: FOLVITE  Take 1 tablet (1 mg total) by mouth once daily.     levothyroxine 50 MCG tablet  Commonly known as: SYNTHROID  Take 1 tablet (50 mcg total) by mouth before breakfast.     lisinopriL 20 MG tablet  Commonly known as: PRINIVIL,ZESTRIL  Take 1 tablet (20 mg total) by mouth once daily.     loperamide 2 mg capsule  Commonly known as: IMODIUM  Take 2 mg by mouth 4 (four) times daily as needed for Diarrhea (Per package directions).     melatonin  Commonly known as: MELATIN  Take by mouth nightly as needed for Insomnia.     metFORMIN 500 MG ER 24hr tablet  Commonly known as: GLUCOPHAGE-XR  Take 2 tablets (1,000 mg total) by mouth 2 (two) times daily with meals.     ondansetron 4 MG Tbdl  Commonly known as: ZOFRAN-ODT  Take 1 tablet (4 mg total) by mouth every 6 (six) hours as needed (nausea).     thiamine 100 MG tablet  Take 1 tablet (100 mg total) by mouth once daily.     traZODone 300 MG tablet  Commonly known as: DESYREL  Take 1 tablet (300 mg total) by mouth every evening.            ASK your doctor about these medications      BREO ELLIPTA 100-25 mcg/dose diskus inhaler  Generic drug: fluticasone furoate-vilanteroL  Inhale 1 puff into the lungs once daily. Controller              Indwelling Lines/Drains at time of discharge:   Lines/Drains/Airways       None                   Time spent on the discharge of patient: 40 minutes       King Wallace MD  Internal Medicine, PGY-1  [facility]

## 2023-05-23 ENCOUNTER — TRANSFERRED RECORDS (OUTPATIENT)
Dept: MULTI SPECIALTY CLINIC | Facility: CLINIC | Age: 53
End: 2023-05-23

## 2023-12-23 ENCOUNTER — HEALTH MAINTENANCE LETTER (OUTPATIENT)
Age: 53
End: 2023-12-23

## 2024-05-11 ENCOUNTER — HEALTH MAINTENANCE LETTER (OUTPATIENT)
Age: 54
End: 2024-05-11

## 2024-05-18 SDOH — HEALTH STABILITY: PHYSICAL HEALTH: ON AVERAGE, HOW MANY MINUTES DO YOU ENGAGE IN EXERCISE AT THIS LEVEL?: 20 MIN

## 2024-05-18 SDOH — HEALTH STABILITY: PHYSICAL HEALTH: ON AVERAGE, HOW MANY DAYS PER WEEK DO YOU ENGAGE IN MODERATE TO STRENUOUS EXERCISE (LIKE A BRISK WALK)?: 2 DAYS

## 2024-05-18 ASSESSMENT — SOCIAL DETERMINANTS OF HEALTH (SDOH): HOW OFTEN DO YOU GET TOGETHER WITH FRIENDS OR RELATIVES?: ONCE A WEEK

## 2024-05-23 ENCOUNTER — OFFICE VISIT (OUTPATIENT)
Dept: INTERNAL MEDICINE | Facility: CLINIC | Age: 54
End: 2024-05-23
Payer: COMMERCIAL

## 2024-05-23 VITALS
HEIGHT: 63 IN | DIASTOLIC BLOOD PRESSURE: 84 MMHG | TEMPERATURE: 98 F | RESPIRATION RATE: 18 BRPM | HEART RATE: 88 BPM | WEIGHT: 208 LBS | BODY MASS INDEX: 36.86 KG/M2 | OXYGEN SATURATION: 97 % | SYSTOLIC BLOOD PRESSURE: 133 MMHG

## 2024-05-23 DIAGNOSIS — Z87.891 PERSONAL HISTORY OF TOBACCO USE: ICD-10-CM

## 2024-05-23 DIAGNOSIS — Z12.4 CERVICAL CANCER SCREENING: ICD-10-CM

## 2024-05-23 DIAGNOSIS — R32 URINARY INCONTINENCE, UNSPECIFIED TYPE: ICD-10-CM

## 2024-05-23 DIAGNOSIS — Z13.29 SCREENING FOR THYROID DISORDER: ICD-10-CM

## 2024-05-23 DIAGNOSIS — M25.511 ACUTE PAIN OF BOTH SHOULDERS: ICD-10-CM

## 2024-05-23 DIAGNOSIS — M25.512 ACUTE PAIN OF BOTH SHOULDERS: ICD-10-CM

## 2024-05-23 DIAGNOSIS — Z11.59 NEED FOR HEPATITIS C SCREENING TEST: ICD-10-CM

## 2024-05-23 DIAGNOSIS — Z13.220 SCREENING CHOLESTEROL LEVEL: ICD-10-CM

## 2024-05-23 DIAGNOSIS — Z00.00 ROUTINE GENERAL MEDICAL EXAMINATION AT A HEALTH CARE FACILITY: Primary | ICD-10-CM

## 2024-05-23 DIAGNOSIS — Z12.31 VISIT FOR SCREENING MAMMOGRAM: ICD-10-CM

## 2024-05-23 DIAGNOSIS — Z11.4 SCREENING FOR HIV (HUMAN IMMUNODEFICIENCY VIRUS): ICD-10-CM

## 2024-05-23 LAB
BASOPHILS # BLD AUTO: 0 10E3/UL (ref 0–0.2)
BASOPHILS NFR BLD AUTO: 1 %
EOSINOPHIL # BLD AUTO: 0.1 10E3/UL (ref 0–0.7)
EOSINOPHIL NFR BLD AUTO: 1 %
ERYTHROCYTE [DISTWIDTH] IN BLOOD BY AUTOMATED COUNT: 13 % (ref 10–15)
HCT VFR BLD AUTO: 43.2 % (ref 35–47)
HGB BLD-MCNC: 14 G/DL (ref 11.7–15.7)
IMM GRANULOCYTES # BLD: 0 10E3/UL
IMM GRANULOCYTES NFR BLD: 0 %
LYMPHOCYTES # BLD AUTO: 1.6 10E3/UL (ref 0.8–5.3)
LYMPHOCYTES NFR BLD AUTO: 28 %
MCH RBC QN AUTO: 28 PG (ref 26.5–33)
MCHC RBC AUTO-ENTMCNC: 32.4 G/DL (ref 31.5–36.5)
MCV RBC AUTO: 86 FL (ref 78–100)
MONOCYTES # BLD AUTO: 0.3 10E3/UL (ref 0–1.3)
MONOCYTES NFR BLD AUTO: 6 %
NEUTROPHILS # BLD AUTO: 3.7 10E3/UL (ref 1.6–8.3)
NEUTROPHILS NFR BLD AUTO: 64 %
PLATELET # BLD AUTO: 264 10E3/UL (ref 150–450)
RBC # BLD AUTO: 5 10E6/UL (ref 3.8–5.2)
WBC # BLD AUTO: 5.8 10E3/UL (ref 4–11)

## 2024-05-23 PROCEDURE — 80053 COMPREHEN METABOLIC PANEL: CPT

## 2024-05-23 PROCEDURE — G0296 VISIT TO DETERM LDCT ELIG: HCPCS

## 2024-05-23 PROCEDURE — G0145 SCR C/V CYTO,THINLAYER,RESCR: HCPCS

## 2024-05-23 PROCEDURE — 86803 HEPATITIS C AB TEST: CPT

## 2024-05-23 PROCEDURE — 85025 COMPLETE CBC W/AUTO DIFF WBC: CPT

## 2024-05-23 PROCEDURE — 87624 HPV HI-RISK TYP POOLED RSLT: CPT

## 2024-05-23 PROCEDURE — 99386 PREV VISIT NEW AGE 40-64: CPT

## 2024-05-23 PROCEDURE — 87389 HIV-1 AG W/HIV-1&-2 AB AG IA: CPT

## 2024-05-23 PROCEDURE — 84443 ASSAY THYROID STIM HORMONE: CPT

## 2024-05-23 PROCEDURE — 84439 ASSAY OF FREE THYROXINE: CPT

## 2024-05-23 PROCEDURE — 36415 COLL VENOUS BLD VENIPUNCTURE: CPT

## 2024-05-23 PROCEDURE — 80061 LIPID PANEL: CPT

## 2024-05-23 NOTE — PROGRESS NOTES
Preventive Care Visit  [facility] ISABELLA Skinner CNP, Internal Medicine  May 23, 2024    Assessment & Plan     (Z00.00) Routine general medical examination at a health care facility  (primary encounter diagnosis)  Comment: Patient seen in clinic today for preventive health exam. Patient is having issues with urinary incontinence and shoulder pain. Discussed need for referrals. Discussed lung cancer screening. Discussed other recommended screenings and labs important to today's visit.   Plan: REVIEW OF HEALTH MAINTENANCE PROTOCOL ORDERS,         Comprehensive metabolic panel (BMP + Alb, Alk         Phos, ALT, AST, Total. Bili, TP), CBC with         platelets and differential        Pending     (Z11.4) Screening for HIV (human immunodeficiency virus)  Comment: Discussed recommendation to screen  Plan: HIV Antigen Antibody Combo        Pending    (Z11.59) Need for hepatitis C screening test  Comment: Discussed recommendation to screen  Plan: Hepatitis C Screen Reflex to HCV RNA Quant and         Genotype        Pending    (Z12.31) Visit for screening mammogram  Comment: Has mammogram scheduled for next week.           (Z12.4) Cervical cancer screening  Comment: Discussed recommendation to screen  Plan: Pap Screen with HPV - Recommended Age 30 - 65         Years        Pending     (M25.511,  M25.512) Acute pain of both shoulders  Comment: Injured both shoulders shoveling in March and has not had a decrease in syptoms rating pain 10/10 at worst, 1/2-10 at best. Does have issues with ROM at times.   Plan: Orthopedic  Referral        Future     (Z87.891) Personal history of tobacco use  Comment: Discussed lung cancer screening  Plan: Prof fee: Shared Decision Making for Lung         Cancer Screening, CT Chest Lung Cancer Scrn Low        Dose wo        Future     (Z13.220) Screening cholesterol level  Comment: Discussed screening this visit.   Plan: Lipid panel reflex to direct LDL  "Fasting        Pending     (Z13.29) Screening for thyroid disorder  Comment: Discussed screening this visit.   Plan: TSH with free T4 reflex        Pending    (R32) Urinary incontinence, unspecified type  Comment: Discussed need for referral due to issues with incontinence.  Plan: Adult Uro/Gyn  Referral        Pending     Patient has been advised of split billing requirements and indicates understanding: Yes        BMI  Estimated body mass index is 37.44 kg/m  as calculated from the following:    Height as of this encounter: 1.588 m (5' 2.5\").    Weight as of this encounter: 94.3 kg (208 lb).       Counseling  Appropriate preventive services were discussed with this patient, including applicable screening as appropriate for fall prevention, nutrition, physical activity, Tobacco-use cessation, weight loss and cognition.  Checklist reviewing preventive services available has been given to the patient.  Reviewed patient's diet, addressing concerns and/or questions.   She is at risk for lack of exercise and has been provided with information to increase physical activity for the benefit of her well-being.   The patient was instructed to see the dentist every 6 months.       CONSULTATION/REFERRAL to Urology/Gynecology and Orhtopedic    Subjective   Mary Anne is a 53 year old, presenting for the following:  Physical        5/23/2024     3:13 PM   Additional Questions   Roomed by Kira HARRIS        Via the Health Maintenance questionnaire, the patient has reported the following services have been completed -Colonscopy: Tonya peres mgi 2023-05-23, this information has been sent to the abstraction team.  Health Care Directive  Patient does not have a Health Care Directive or Living Will: Discussed advance care planning with patient; however, patient declined at this time.    HPI      Discuss thyroid blood work, left shoulder pain occasionally         5/18/2024   General Health   How would you rate your overall physical " health? Good   Feel stress (tense, anxious, or unable to sleep) To some extent   (!) STRESS CONCERN      5/18/2024   Nutrition   Three or more servings of calcium each day? (!) NO   Diet: Regular (no restrictions)   How many servings of fruit and vegetables per day? (!) 0-1   How many sweetened beverages each day? (!) 3         5/18/2024   Exercise   Days per week of moderate/strenous exercise 2 days   Average minutes spent exercising at this level 20 min   (!) EXERCISE CONCERN      5/18/2024   Social Factors   Frequency of gathering with friends or relatives Once a week   Worry food won't last until get money to buy more No   Food not last or not have enough money for food? No   Do you have housing?  Yes   Are you worried about losing your housing? No   Lack of transportation? No   Unable to get utilities (heat,electricity)? No         5/18/2024   Fall Risk   Fallen 2 or more times in the past year? No   Trouble with walking or balance? No          5/18/2024   Dental   Dentist two times every year? (!) NO         5/18/2024   TB Screening   Were you born outside of the [place]? No         Today's PHQ-2 Score:       5/22/2024     5:05 PM   PHQ-2 ( 1999 [employer])   Q1: Little interest or pleasure in doing things 0   Q2: Feeling down, depressed or hopeless 1   PHQ-2 Score 1   Q1: Little interest or pleasure in doing things Not at all   Q2: Feeling down, depressed or hopeless Several days   PHQ-2 Score 1           5/18/2024   Substance Use   Alcohol more than 3/day or more than 7/wk No   Do you use any other substances recreationally? No     Social History     Tobacco Use    Smoking status: Former    Smokeless tobacco: Never   Substance Use Topics    Alcohol use: Yes     Comment: mixed drinks only half a glass    Drug use: Never             5/18/2024   One time HIV Screening   Previous HIV test? No         5/18/2024   STI Screening   New sexual partner(s) since last STI/HIV test? No     History of abnormal Pap smear: Yes      "  ASCVD Risk   The ASCVD Risk score (Paul FONTAINE, et al., 2019) failed to calculate for the following reasons:    Cannot find a previous HDL lab    Cannot find a previous total cholesterol lab    Reviewed and updated as needed this visit by Provider                    Lab work is in process      Review of Systems  Constitutional, HEENT, cardiovascular, pulmonary, gi and gu systems are negative, except as otherwise noted.     Objective    Exam  /84 (BP Location: Left arm, Patient Position: Sitting, Cuff Size: Adult Large)   Pulse 88   Temp 98  F (36.7  C) (Temporal)   Resp 18   Ht 1.588 m (5' 2.5\")   Wt 94.3 kg (208 lb)   SpO2 97%   BMI 37.44 kg/m     Estimated body mass index is 37.44 kg/m  as calculated from the following:    Height as of this encounter: 1.588 m (5' 2.5\").    Weight as of this encounter: 94.3 kg (208 lb).    Physical Exam  GENERAL: alert and no distress  EYES: Eyes grossly normal to inspection, PERRL and conjunctivae and sclerae normal  HENT: ear canals and TM's normal, nose and mouth without ulcers or lesions  NECK: no adenopathy, no asymmetry, masses, or scars  RESP: lungs clear to auscultation - no rales, rhonchi or wheezes  CV: regular rate and rhythm, normal S1 S2, no S3 or S4, no murmur, click or rub, no peripheral edema  ABDOMEN: soft, nontender, no hepatosplenomegaly, no masses and bowel sounds normal  MS: no gross musculoskeletal defects noted, no edema  SKIN: no suspicious lesions or rashes  NEURO: Normal strength and tone, mentation intact and speech normal  PSYCH: mentation appears normal, affect normal/bright        Signed Electronically by: ISABELLA Smith CNP  " ht- 5'3  wt- 157lbs

## 2024-05-23 NOTE — PATIENT INSTRUCTIONS
Lung Cancer Screening   Frequently Asked Questions  If you are at high-risk for lung cancer, getting screened with low-dose computed tomography (LDCT) every year can help save your life. This handout offers answers to some of the most common questions about lung cancer screening. If you have other questions, please call [phone] ([phone]).     What is it?  Lung cancer screening uses special X-ray technology to create an image of your lung tissue. The exam is quick and easy and takes less than 10 seconds. We don t give you any medicine or use any needles. You can eat before and after the exam. You don t need to change your clothes as long as the clothing on your chest doesn t contain metal. But, you do need to be able to hold your breath for at least 6 seconds during the exam.    What is the goal of lung cancer screening?  The goal of lung cancer screening is to save lives. Many times, lung cancer is not found until a person starts having physical symptoms. Lung cancer screening can help detect lung cancer in the earliest stages when it may be easier to treat.    Who should be screened for lung cancer?  We suggest lung cancer screening for anyone who is at high-risk for lung cancer. You are in the high-risk group if you:      are between the ages of 55 and 79, and    have smoked at least 1 pack of cigarettes a day for 20 or more years, and    still smoke or have quit within the past 15 years.    However, if you have a new cough or shortness of breath, you should talk to your doctor before being screened.    Why does it matter if I have symptoms?  Certain symptoms can be a sign that you have a condition in your lungs that should be checked and treated by your doctor. These symptoms include fever, chest pain, a new or changing cough, shortness of breath that you have never felt before, coughing up blood or unexplained weight loss. Having any of these symptoms can greatly affect the results of lung  cancer screening.       Should all smokers get an LDCT lung cancer screening exam?  It depends. Lung cancer screening is for a very specific group of men and women who have a history of heavy smoking over a long period of time (see  Who should be screened for lung cancer  above).  I am in the high-risk group, but have been diagnosed with cancer in the past. Is LDCT lung cancer screening right for me?  In some cases, you should not have LDCT lung screening, such as when your doctor is already following your cancer with CT scan studies. Your doctor will help you decide if LDCT lung screening is right for you.  Do I need to have a screening exam every year?  Yes. If you are in the high-risk group described earlier, you should get an LDCT lung cancer screening exam every year until you are 79, or are no longer willing or able to undergo screening and possible procedures to diagnose and treat lung cancer.  How effective is LDCT at preventing death from lung cancer?  Studies have shown that LDCT lung cancer screening can lower the risk of death from lung cancer by 20 percent in people who are at high-risk.  What are the risks?  There are some risks and limitations of LDCT lung cancer screening. We want to make sure you understand the risks and benefits, so please let us know if you have any questions. Your doctor may want to talk with you more about these risks.    Radiation exposure: As with any exam that uses radiation, there is a very small increased risk of cancer. The amount of radiation in LDCT is small--about the same amount a person would get from a mammogram. Your doctor orders the exam when he or she feels the potential benefits outweigh the risks.    False negatives: No test is perfect, including LDCT. It is possible that you may have a medical condition, including lung cancer, that is not found during your exam. This is called a false negative result.    False positives and more testing: LDCT very often finds  something in the lung that could be cancer, but in fact is not. This is called a false positive result. False positive tests often cause anxiety. To make sure these findings are not cancer, you may need to have more tests. These tests will be done only if you give us permission. Sometimes patients need a treatment that can have side effects, such as a biopsy. For more information on false positives, see  What can I expect from the results?     Findings not related to lung cancer: Your LDCT exam also takes pictures of areas of your body next to your lungs. In a very small number of cases, the CT scan will show an abnormal finding in one of these areas, such as your kidneys, adrenal glands, liver or thyroid. This finding may not be serious, but you may need more tests. Your doctor can help you decide what other tests you may need, if any.  What can I expect from the results?  About 1 out of 4 LDCT exams will find something that may need more tests. Most of the time, these findings are lung nodules. Lung nodules are very small collections of tissue in the lung. These nodules are very common, and the vast majority--more than 97 percent--are not cancer (benign). Most are normal lymph nodes or small areas of scarring from past infections.  But, if a small lung nodule is found to be cancer, the cancer can be cured more than 90 percent of the time. To know if the nodule is cancer, we may need to get more images before your next yearly screening exam. If the nodule has suspicious features (for example, it is large, has an odd shape or grows over time), we will refer you to a specialist for further testing.  Will my doctor also get the results?  Yes. Your doctor will get a copy of your results.  Is it okay to keep smoking now that there s a cancer screening exam?  No. Tobacco is one of the strongest cancer-causing agents. It causes not only lung cancer, but other cancers and cardiovascular (heart) diseases as well. The damage  "caused by smoking builds over time. This means that the longer you smoke, the higher your risk of disease. While it is never too late to quit, the sooner you quit, the better.  Where can I find help to quit smoking?  The best way to prevent lung cancer is to stop smoking. If you have already quit smoking, congratulations and keep it up! For help on quitting smoking, please call [employer] at 0-044-QUITNOW (1-303.758.1590) or the American Cancer Society at 1-196.998.6255 to find local resources near you.  One-on-one health coaching:  If you d prefer to work individually with a health care provider on tobacco cessation, we offer:      Medication Therapy Management:  Our specially trained pharmacists work closely with you and your doctor to help you quit smoking.  Call [phone] or [phone] (toll free).    Preventive Care Advice   This is general advice we often give to help people stay healthy. Your care team may have specific advice just for you. Please talk to your care team about your own preventive care needs.  Lifestyle  Exercise at least 150 minutes each week (30 minutes a day, 5 days a week).  Do muscle strengthening activities 2 days a week. These help control your weight and prevent disease.  No smoking.  Wear sunscreen to prevent skin cancer.  Have your home tested for radon every 2 to 5 years. Radon is a colorless, odorless gas that can harm your lungs. To learn more, go to www.health.[facility].mn. and search for \"Radon in Homes.\"  Keep guns unloaded and locked up in a safe place like a safe or gun vault, or, use a gun lock and hide the keys. Always lock away bullets separately. To learn more, visit [employer].[place].gov and search for \"safe gun storage.\"  Nutrition  Eat 5 or more servings of fruits and vegetables each day.  Try wheat bread, brown rice and whole grain pasta (instead of white bread, rice, and pasta).  Get enough calcium and vitamin D. Check the label on foods and aim for 100% of the RDA " (recommended daily allowance).  Regular exams  Have a dental exam and cleaning every 6 months.  See your health care team every year to talk about:  Any changes in your health.  Any medicines your care team has prescribed.  Preventive care, family planning, and ways to prevent chronic diseases.  Shots (vaccines)   HPV shots (up to age 26), if you've never had them before.  Hepatitis B shots (up to age 59), if you've never had them before.  COVID-19 shot: Get this shot when it's due.  Flu shot: Get a flu shot every year.  Tetanus shot: Get a tetanus shot every 10 years.  Pneumococcal, hepatitis A, and RSV shots: Ask your care team if you need these based on your risk.  Shingles shot (for age 50 and up).  General health tests  Diabetes screening:  Starting at age 35, Get screened for diabetes at least every 3 years.  If you are younger than age 35, ask your care team if you should be screened for diabetes.  Cholesterol test: At age 39, start having a cholesterol test every 5 years, or more often if advised.  Bone density scan (DEXA): At age 50, ask your care team if you should have this scan for osteoporosis (brittle bones).  Hepatitis C: Get tested at least once in your life.  Abdominal aortic aneurysm screening: Talk to your doctor about having this screening if you:  Have ever smoked; and  Are biologically male; and  Are between the ages of 65 and 75.  STIs (sexually transmitted infections)  Before age 24: Ask your care team if you should be screened for STIs.  After age 24: Get screened for STIs if you're at risk. You are at risk for STIs (including HIV) if:  You are sexually active with more than one person.  You don't use condoms every time.  You or a partner was diagnosed with a sexually transmitted infection.  If you are at risk for HIV, ask about PrEP medicine to prevent HIV.  Get tested for HIV at least once in your life, whether you are at risk for HIV or not.  Cancer screening tests  Cervical cancer  screening: If you have a cervix, begin getting regular cervical cancer screening tests at age 21. Most people who have regular screenings with normal results can stop after age 65. Talk about this with your provider.  Breast cancer scan (mammogram): If you've ever had breasts, begin having regular mammograms starting at age 40. This is a scan to check for breast cancer.  Colon cancer screening: It is important to start screening for colon cancer at age 45.  Have a colonoscopy test every 10 years (or more often if you're at risk) Or, ask your provider about stool tests like a FIT test every year or Cologuard test every 3 years.  To learn more about your testing options, visit: www.Need/563836.pdf.  For help making a decision, visit: tatiana/hi83452.  Prostate cancer screening test: If you have a prostate and are age 55 to 69, ask your provider if you would benefit from a yearly prostate cancer screening test.  Lung cancer screening: If you are a current or former smoker age 50 to 80, ask your care team if ongoing lung cancer screenings are right for you.  For informational purposes only. Not to replace the advice of your health care provider. Copyright   2023 [facility] [employer]. All rights reserved. Clinically reviewed by the [facility]. MMIT 109442 - REV 04/24.    Learning About Stress  What is stress?     Stress is your body's response to a hard situation. Your body can have a physical, emotional, or mental response. Stress is a fact of life for most people, and it affects everyone differently. What causes stress for you may not be stressful for someone else.  A lot of things can cause stress. You may feel stress when you go on a job interview, take a test, or run a race. This kind of short-term stress is normal and even useful. It can help you if you need to work hard or react quickly. For example, stress can help you finish an important job on time.  Long-term stress  is caused by ongoing stressful situations or events. Examples of long-term stress include long-term health problems, ongoing problems at work, or conflicts in your family. Long-term stress can harm your health.  How does stress affect your health?  When you are stressed, your body responds as though you are in danger. It makes hormones that speed up your heart, make you breathe faster, and give you a burst of energy. This is called the fight-or-flight stress response. If the stress is over quickly, your body goes back to normal and no harm is done.  But if stress happens too often or lasts too long, it can have bad effects. Long-term stress can make you more likely to get sick, and it can make symptoms of some diseases worse. If you tense up when you are stressed, you may develop neck, shoulder, or low back pain. Stress is linked to high blood pressure and heart disease.  Stress also harms your emotional health. It can make you garcia, tense, or depressed. Your relationships may suffer, and you may not do well at work or school.  What can you do to manage stress?  You can try these things to help manage stress:   Do something active. Exercise or activity can help reduce stress. Walking is a great way to get started. Even everyday activities such as housecleaning or yard work can help.  Try yoga or ganga chi. These techniques combine exercise and meditation. You may need some training at first to learn them.  Do something you enjoy. For example, listen to music or go to a movie. Practice your hobby or do volunteer work.  Meditate. This can help you relax, because you are not worrying about what happened before or what may happen in the future.  Do guided imagery. Imagine yourself in any setting that helps you feel calm. You can use online videos, books, or a teacher to guide you.  Do breathing exercises. For example:  From a standing position, bend forward from the waist with your knees slightly bent. Let your arms  "dangle close to the floor.  Breathe in slowly and deeply as you return to a standing position. Roll up slowly and lift your head last.  Hold your breath for just a few seconds in the standing position.  Breathe out slowly and bend forward from the waist.  Let your feelings out. Talk, laugh, cry, and express anger when you need to. Talking with supportive friends or family, a counselor, or a darryl leader about your feelings is a healthy way to relieve stress. Avoid discussing your feelings with people who make you feel worse.  Write. It may help to write about things that are bothering you. This helps you find out how much stress you feel and what is causing it. When you know this, you can find better ways to cope.  What can you do to prevent stress?  You might try some of these things to help prevent stress:  Manage your time. This helps you find time to do the things you want and need to do.  Get enough sleep. Your body recovers from the stresses of the day while you are sleeping.  Get support. Your family, friends, and community can make a difference in how you experience stress.  Limit your news feed. Avoid or limit time on social media or news that may make you feel stressed.  Do something active. Exercise or activity can help reduce stress. Walking is a great way to get started.  Where can you learn more?  Go to https://www.[employer].net/patiented  Enter N032 in the search box to learn more about \"Learning About Stress.\"  Current as of: October 24, 2023               Content Version: 14.0    2743-4249 [employer].   Care instructions adapted under license by your healthcare professional. If you have questions about a medical condition or this instruction, always ask your healthcare professional. [employer] disclaims any warranty or liability for your use of this information.      "

## 2024-05-23 NOTE — PROGRESS NOTES
Lung Cancer Screening Shared Decision Making Visit     Mary Anne Taylor, a 53 year old female, is eligible for lung cancer screening    History   Smoking Status     Former   Smokeless Tobacco     Never       I have discussed with patient the risks and benefits of screening for lung cancer with low-dose CT.     The risks include:    radiation exposure: one low dose chest CT has as much ionizing radiation as about 15 chest x-rays, or 6 months of background radiation living in [place]      false positives: most findings/nodules are NOT cancer, but some might still require additional diagnostic evaluation, including biopsy    over-diagnosis: some slow growing cancers that might never have been clinically significant will be detected and treated unnecessarily     The benefit of early detection of lung cancer is contingent upon adherence to annual screening or more frequent follow up if indicated.     Furthermore, to benefit from screening, Mary Anne must be willing and able to undergo diagnostic procedures, if indicated. Although no specific guide is available for determining severity of comorbidities, it is reasonable to withhold screening in patients who have greater mortality risk from other diseases.     We did discuss that the best way to prevent lung cancer is to not smoke.    Some patients may value a numeric estimation of lung cancer risk when evaluating if lung cancer screening is right for them, here is one calculator:    ShouldIScreen

## 2024-05-24 LAB
HPV HR 12 DNA CVX QL NAA+PROBE: NEGATIVE
HPV16 DNA CVX QL NAA+PROBE: NEGATIVE
HPV18 DNA CVX QL NAA+PROBE: NEGATIVE
HUMAN PAPILLOMA VIRUS FINAL DIAGNOSIS: NORMAL

## 2024-05-25 LAB
ALBUMIN SERPL BCG-MCNC: 4.7 G/DL (ref 3.5–5.2)
ALP SERPL-CCNC: 53 U/L (ref 40–150)
ALT SERPL W P-5'-P-CCNC: 9 U/L (ref 0–50)
ANION GAP SERPL CALCULATED.3IONS-SCNC: 12 MMOL/L (ref 7–15)
AST SERPL W P-5'-P-CCNC: 15 U/L (ref 0–45)
BILIRUB SERPL-MCNC: 0.6 MG/DL
BUN SERPL-MCNC: 15 MG/DL (ref 6–20)
CALCIUM SERPL-MCNC: 9.7 MG/DL (ref 8.6–10)
CHLORIDE SERPL-SCNC: 100 MMOL/L (ref 98–107)
CHOLEST SERPL-MCNC: 244 MG/DL
CREAT SERPL-MCNC: 0.83 MG/DL (ref 0.51–0.95)
DEPRECATED HCO3 PLAS-SCNC: 24 MMOL/L (ref 22–29)
EGFRCR SERPLBLD CKD-EPI 2021: 83 ML/MIN/1.73M2
FASTING STATUS PATIENT QL REPORTED: YES
FASTING STATUS PATIENT QL REPORTED: YES
GLUCOSE SERPL-MCNC: 92 MG/DL (ref 70–99)
HCV AB SERPL QL IA: NONREACTIVE
HDLC SERPL-MCNC: 64 MG/DL
HIV 1+2 AB+HIV1 P24 AG SERPL QL IA: NONREACTIVE
LDLC SERPL CALC-MCNC: 162 MG/DL
NONHDLC SERPL-MCNC: 180 MG/DL
POTASSIUM SERPL-SCNC: 4.1 MMOL/L (ref 3.4–5.3)
PROT SERPL-MCNC: 7.6 G/DL (ref 6.4–8.3)
SODIUM SERPL-SCNC: 136 MMOL/L (ref 135–145)
T4 FREE SERPL-MCNC: 1.07 NG/DL (ref 0.9–1.7)
TRIGL SERPL-MCNC: 89 MG/DL
TSH SERPL DL<=0.005 MIU/L-ACNC: 0.24 UIU/ML (ref 0.3–4.2)

## 2024-05-28 DIAGNOSIS — E03.8 SUBCLINICAL HYPOTHYROIDISM: Primary | ICD-10-CM

## 2024-05-28 DIAGNOSIS — E78.5 HYPERLIPIDEMIA, UNSPECIFIED HYPERLIPIDEMIA TYPE: ICD-10-CM

## 2024-05-28 PROBLEM — Z98.51 STATUS POST TUBAL LIGATION: Status: RESOLVED | Noted: 2017-11-03 | Resolved: 2024-05-28

## 2024-05-28 NOTE — RESULT ENCOUNTER NOTE
Mary Anne,    Your blood glucose, liver, kidney, blood counts, hepatitis C and HIV tests are normal.     Your thyroid looks a bit overactive. Follow-up with your provider for recheck and to discuss next steps in 1 - 2 months.     Your cholesterol is somewhat high. Eat 4 - 5 servings of vegetables and fruits every day. Eat nuts, seeds, and whole grains (such as wheat pasta and wheat bread). Drink water and milk instead of pop and juice. Avoid sweets. Exercise regularly, with a goal of 150 total minutes a week. Follow-up yearly.     Jordyn Lopez MD    The 10-year ASCVD risk score (Paul FONTAINE, et al., 2019) is: 2.3%    Values used to calculate the score:      Age: 53 years      Sex: Female      Is Non- : No      Diabetic: No      Tobacco smoker: No      Systolic Blood Pressure: 133 mmHg      Is BP treated: No      HDL Cholesterol: 64 mg/dL      Total Cholesterol: 244 mg/dL

## 2024-05-30 PROBLEM — R87.619 ATYPICAL GLANDULAR CELLS OF UNDETERMINED SIGNIFICANCE (AGUS) ON CERVICAL PAP SMEAR: Status: ACTIVE | Noted: 2019-10-02

## 2024-05-30 LAB
BKR LAB AP GYN ADEQUACY: NORMAL
BKR LAB AP GYN INTERPRETATION: NORMAL
BKR LAB AP LMP: NORMAL
BKR LAB AP PREVIOUS ABNL DX: NORMAL
BKR LAB AP PREVIOUS ABNORMAL: NORMAL
PATH REPORT.COMMENTS IMP SPEC: NORMAL
PATH REPORT.COMMENTS IMP SPEC: NORMAL
PATH REPORT.RELEVANT HX SPEC: NORMAL

## 2024-06-06 NOTE — PROGRESS NOTES
ASSESSMENT & PLAN    Mary Anne was seen today for injury and injury.    Diagnoses and all orders for this visit:    Injury of both shoulders, initial encounter  -     XR Shoulder Bilateral G/E 2 Views    Other orders  -     Orthopedic  Referral    *following shoveling*  PT offered, HEP reviewed.  See below.  Questions answered. Discussed signs and symptoms that may indicate more serious issues; the patient was instructed to seek appropriate care if noted. Mary Anne indicates understanding of these issues and agrees with the plan.      See Patient Instructions  Patient Instructions   Reviewed nature of the ongoing shoulder issues, left greater than right, following swelling.  Consistent with soft tissue source.  We discussed potential rotator cuff involvement, given location of the pain, ongoing symptoms.  However, favor more proximal biceps involvement, given the initial onset with shoveling, and some exam findings today.  For next steps, we discussed considerations around rehab approach, additional imaging with MRI, also use of medications, injection.  Following discussion, plan rehab exercises next.  Home exercises reviewed today to begin.  If interested in referral to physical therapy, contact clinic.  Monitor course with home exercises 1 month to start.  If improving well, then follow-up is open-ended.  Otherwise, contact clinic if any other questions or concerns.    If you have any further questions for your physician or physician s care team you can contact them thru MyChart or by calling [phone].      Jason Vigil [facility]      CC: Marquita Arita      -----  No chief complaint on file.      SUBJECTIVE  Mary Anne Taylor is a/an 53 year old female who is seen in consultation at the request of  Marquita Arita C.N.P. for evaluation of bilateral shoulder.     The patient is seen by themselves.  The patient is Right handed    Onset: 3 month(s)  "ago. Patient describes injury as shoveling when she was lifting a shovel and felt a sharp instance of pain  Location of Pain: bilateral shoulder, left worse than right, upper arm and shoulder  Worsened by: Flexion, reaching behind, extension with internal rotation (noted as worst)  Better with:   Treatments tried: Tylenol, lidocaine patches  Associated symptoms: no distal numbness or tingling; denies swelling or warmth    Orthopedic/Surgical history: NO  Social History/Occupation: Desk work    **  Above information per rooming staff.  Additional history:  Pain L > R.  Recalls pain with one motion of pulling/shoveling. Continued to shovel.  Pain has been more anterior, anterolateral.  Comes and goes.  No pain at rest currently.  No swelling or bruising. No noise noted.        REVIEW OF SYSTEMS:  Review of Systems    OBJECTIVE:  Ht 1.575 m (5' 2\")   Wt 94.3 kg (208 lb)   BMI 38.04 kg/m           Bilateral Shoulder exam    ROM:      forward flexion grossly full, symmetric, ~160-170, no pain        abduction grossly full, symmetric, ~150-160, min pain left       internal rotation 1-2 vertebral segments lower than right, mild pain       external rotation grossly symmetric, no change    Tender: mild anterior left shoulder, bicipital groove, subacromial space    Strength:      abduction 5/5       internal rotation 5/5       external rotation 5-/5  No change pain    Impingement testing:      neg (-) Medina       neg (-) empty can         Speed pos left  Yergason no change    Skin:      no visible deformities       well perfused       capillary refill brisk    Sensation:      normal sensation over shoulder and upper extremity       RADIOLOGY:  Final results and radiologist's interpretation, available in the [facility] health record.  Images were reviewed with the patient in the office today.  My personal interpretation of the performed imaging: no acute bony abnormality noted. AC and GH joints appear preserved.      SHOULDER " BILATERAL G/E 2 VIEWS   6/7/2024 1:32 PM      HISTORY: Onset 3 months ago, diffuse pain around shoulder and into  upper arm, suspect RTC; Injury of both shoulders, initial encounter.     COMPARISON: None.                                                                      IMPRESSION:     Right: Normal.     Left: Normal.     ENZO DARDEN MD

## 2024-06-07 ENCOUNTER — OFFICE VISIT (OUTPATIENT)
Dept: ORTHOPEDICS | Facility: CLINIC | Age: 54
End: 2024-06-07
Payer: COMMERCIAL

## 2024-06-07 ENCOUNTER — ANCILLARY PROCEDURE (OUTPATIENT)
Dept: GENERAL RADIOLOGY | Facility: CLINIC | Age: 54
End: 2024-06-07
Attending: PEDIATRICS
Payer: COMMERCIAL

## 2024-06-07 VITALS — BODY MASS INDEX: 38.28 KG/M2 | HEIGHT: 62 IN | WEIGHT: 208 LBS

## 2024-06-07 DIAGNOSIS — S49.91XA INJURY OF BOTH SHOULDERS, INITIAL ENCOUNTER: Primary | ICD-10-CM

## 2024-06-07 DIAGNOSIS — S49.92XA INJURY OF BOTH SHOULDERS, INITIAL ENCOUNTER: Primary | ICD-10-CM

## 2024-06-07 PROCEDURE — 99203 OFFICE O/P NEW LOW 30 MIN: CPT | Performed by: PEDIATRICS

## 2024-06-07 PROCEDURE — 73030 X-RAY EXAM OF SHOULDER: CPT | Mod: TC | Performed by: RADIOLOGY

## 2024-06-07 NOTE — Clinical Note
6/7/2024      Mary Anne Taylor  [address] [facility]  [place] [address]      Dear Colleague,    Thank you for referring your patient, Mary Anne Taylor, to the [facility]. Please see a copy of my visit note below.    ASSESSMENT & PLAN    Mary Anne was seen today for injury and injury.    Diagnoses and all orders for this visit:    Injury of both shoulders, initial encounter  -     XR Shoulder Bilateral G/E 2 Views    Other orders  -     Orthopedic  Referral    *following shoveling*  PT offered, HEP reviewed.  See below.  Questions answered. Discussed signs and symptoms that may indicate more serious issues; the patient was instructed to seek appropriate care if noted. Mary Anne indicates understanding of these issues and agrees with the plan.      See Patient Instructions  Patient Instructions   Reviewed nature of the ongoing shoulder issues, left greater than right, following swelling.  Consistent with soft tissue source.  We discussed potential rotator cuff involvement, given location of the pain, ongoing symptoms.  However, favor more proximal biceps involvement, given the initial onset with shoveling, and some exam findings today.  For next steps, we discussed considerations around rehab approach, additional imaging with MRI, also use of medications, injection.  Following discussion, plan rehab exercises next.  Home exercises reviewed today to begin.  If interested in referral to physical therapy, contact clinic.  Monitor course with home exercises 1 month to start.  If improving well, then follow-up is open-ended.  Otherwise, contact clinic if any other questions or concerns.    If you have any further questions for your physician or physician s care team you can contact them thru [facility] or by calling [phone].      Jason Vigil DO  [facility]      CC: Marquita Arita      -----  No chief complaint on  "file.      SUBJECTIVE  Mary Anne Taylor is a/an 53 year old female who is seen in consultation at the request of  Marquita Arita C.N.P. for evaluation of bilateral shoulder.     The patient is seen by themselves.  The patient is Right handed    Onset: 3 month(s) ago. Patient describes injury as shoveling when she was lifting a shovel and felt a sharp instance of pain  Location of Pain: bilateral shoulder, left worse than right, upper arm and shoulder  Worsened by: Flexion, reaching behind, extension with internal rotation (noted as worst)  Better with:   Treatments tried: Tylenol, lidocaine patches  Associated symptoms: no distal numbness or tingling; denies swelling or warmth    Orthopedic/Surgical history: NO  Social History/Occupation: Desk work    **  Above information per rooming staff.  Additional history:  Pain L > R.  Recalls pain with one motion of pulling/shoveling. Continued to shovel.  Pain has been more anterior, anterolateral.  Comes and goes.  No pain at rest currently.  No swelling or bruising. No noise noted.        REVIEW OF SYSTEMS:  Review of Systems    OBJECTIVE:  Ht 1.575 m (5' 2\")   Wt 94.3 kg (208 lb)   BMI 38.04 kg/m           Bilateral Shoulder exam    ROM:      forward flexion grossly full, symmetric, ~160-170, no pain        abduction grossly full, symmetric, ~150-160, min pain left       internal rotation 1-2 vertebral segments lower than right, mild pain       external rotation grossly symmetric, no change    Tender: mild anterior left shoulder, bicipital groove, subacromial space    Strength:      abduction 5/5       internal rotation 5/5       external rotation 5-/5  No change pain    Impingement testing:      neg (-) Medina       neg (-) empty can         Speed pos left  Yergason no change    Skin:      no visible deformities       well perfused       capillary refill brisk    Sensation:      normal sensation over shoulder and upper extremity       RADIOLOGY:  Final results " and radiologist's interpretation, available in the [facility] health record.  Images were reviewed with the patient in the office today.  My personal interpretation of the performed imaging: no acute bony abnormality noted. AC and GH joints appear preserved.      SHOULDER BILATERAL G/E 2 VIEWS   6/7/2024 1:32 PM      HISTORY: Onset 3 months ago, diffuse pain around shoulder and into  upper arm, suspect RTC; Injury of both shoulders, initial encounter.     COMPARISON: None.                                                                      IMPRESSION:     Right: Normal.     Left: Normal.     ENZO DARDEN MD             Again, thank you for allowing me to participate in the care of your patient.        Sincerely,        Jason Vigil, DO

## 2024-06-07 NOTE — PATIENT INSTRUCTIONS
Reviewed nature of the ongoing shoulder issues, left greater than right, following swelling.  Consistent with soft tissue source.  We discussed potential rotator cuff involvement, given location of the pain, ongoing symptoms.  However, favor more proximal biceps involvement, given the initial onset with shoveling, and some exam findings today.  For next steps, we discussed considerations around rehab approach, additional imaging with MRI, also use of medications, injection.  Following discussion, plan rehab exercises next.  Home exercises reviewed today to begin.  If interested in referral to physical therapy, contact clinic.  Monitor course with home exercises 1 month to start.  If improving well, then follow-up is open-ended.  Otherwise, contact clinic if any other questions or concerns.    If you have any further questions for your physician or physician s care team you can contact them thru [facility] or by calling [phone].

## 2024-06-13 ENCOUNTER — ANCILLARY PROCEDURE (OUTPATIENT)
Dept: CT IMAGING | Facility: CLINIC | Age: 54
End: 2024-06-13
Payer: COMMERCIAL

## 2024-06-13 DIAGNOSIS — Z87.891 PERSONAL HISTORY OF TOBACCO USE: ICD-10-CM

## 2024-06-13 PROCEDURE — 71271 CT THORAX LUNG CANCER SCR C-: CPT | Mod: TC | Performed by: RADIOLOGY

## 2024-07-27 SDOH — HEALTH STABILITY: PHYSICAL HEALTH: ON AVERAGE, HOW MANY MINUTES DO YOU ENGAGE IN EXERCISE AT THIS LEVEL?: 30 MIN

## 2024-07-27 SDOH — HEALTH STABILITY: PHYSICAL HEALTH: ON AVERAGE, HOW MANY DAYS PER WEEK DO YOU ENGAGE IN MODERATE TO STRENUOUS EXERCISE (LIKE A BRISK WALK)?: 2 DAYS

## 2024-07-27 ASSESSMENT — SOCIAL DETERMINANTS OF HEALTH (SDOH): HOW OFTEN DO YOU GET TOGETHER WITH FRIENDS OR RELATIVES?: ONCE A WEEK

## 2024-08-01 ENCOUNTER — OFFICE VISIT (OUTPATIENT)
Dept: INTERNAL MEDICINE | Facility: CLINIC | Age: 54
End: 2024-08-01
Payer: COMMERCIAL

## 2024-08-01 VITALS
BODY MASS INDEX: 37.56 KG/M2 | TEMPERATURE: 97.4 F | OXYGEN SATURATION: 97 % | WEIGHT: 212 LBS | RESPIRATION RATE: 18 BRPM | HEIGHT: 63 IN | SYSTOLIC BLOOD PRESSURE: 135 MMHG | HEART RATE: 82 BPM | DIASTOLIC BLOOD PRESSURE: 73 MMHG

## 2024-08-01 DIAGNOSIS — E03.8 SUBCLINICAL HYPOTHYROIDISM: ICD-10-CM

## 2024-08-01 DIAGNOSIS — E78.5 HYPERLIPIDEMIA, UNSPECIFIED HYPERLIPIDEMIA TYPE: ICD-10-CM

## 2024-08-01 LAB
CHOLEST SERPL-MCNC: 236 MG/DL
FASTING STATUS PATIENT QL REPORTED: YES
HDLC SERPL-MCNC: 61 MG/DL
LDLC SERPL CALC-MCNC: 158 MG/DL
NONHDLC SERPL-MCNC: 175 MG/DL
T4 FREE SERPL-MCNC: 0.77 NG/DL (ref 0.9–1.7)
TRIGL SERPL-MCNC: 84 MG/DL
TSH SERPL DL<=0.005 MIU/L-ACNC: 11.6 UIU/ML (ref 0.3–4.2)

## 2024-08-01 PROCEDURE — 99214 OFFICE O/P EST MOD 30 MIN: CPT

## 2024-08-01 PROCEDURE — 84443 ASSAY THYROID STIM HORMONE: CPT

## 2024-08-01 PROCEDURE — 36415 COLL VENOUS BLD VENIPUNCTURE: CPT

## 2024-08-01 PROCEDURE — 84439 ASSAY OF FREE THYROXINE: CPT

## 2024-08-01 PROCEDURE — 80061 LIPID PANEL: CPT

## 2024-08-01 NOTE — PATIENT INSTRUCTIONS
Bergamot this can help with reducing cholesterol,    Jessica Carpenter good resource on Menopause

## 2024-08-01 NOTE — PROGRESS NOTES
Preventive Care Visit  [facility] ISABELLA Skinner CNP, Internal Medicine  Aug 1, 2024  {Provider  Link to [facility] :234269}    {PROVIDER CHARTING PREFERENCE:140726}    Conrado North is a 53 year old, presenting for the following:  Physical        8/1/2024     6:48 AM   Additional Questions   Roomed by Kira HARRIS        Health Care Directive  Patient does not have a Health Care Directive or Living Will: {ADVANCE_DIRECTIVE_STATUS:290751}    HPI  ***  {MA/LPN/RN Pre-Provider Visit Orders- hCG/UA/Strep (Optional):990941}  {SUPERLIST (Optional):964267}  {additonal problems for provider to add (Optional):062642}      7/27/2024   General Health   How would you rate your overall physical health? Good   Feel stress (tense, anxious, or unable to sleep) Only a little      (!) STRESS CONCERN      7/27/2024   Nutrition   Three or more servings of calcium each day? (!) I DON'T KNOW   Diet: Regular (no restrictions)   How many servings of fruit and vegetables per day? (!) 0-1   How many sweetened beverages each day? (!) 3            7/27/2024   Exercise   Days per week of moderate/strenous exercise 2 days   Average minutes spent exercising at this level 30 min      (!) EXERCISE CONCERN      7/27/2024   Social Factors   Frequency of gathering with friends or relatives Once a week   Worry food won't last until get money to buy more No   Food not last or not have enough money for food? No   Do you have housing? (Housing is defined as stable permanent housing and does not include staying ouside in a car, in a tent, in an abandoned building, in an overnight shelter, or couch-surfing.) Yes   Are you worried about losing your housing? No   Lack of transportation? No   Unable to get utilities (heat,electricity)? No            7/27/2024   Fall Risk   Fallen 2 or more times in the past year? No   Trouble with walking or balance? No             7/27/2024   Dental   Dentist two times every year? (!) NO             5/18/2024   TB Screening   Were you born outside of the [place]? No          {Rooming Staff Patient needs a PHQ as part of the AWV.  Use this link to complete and then refresh the note to pull results Link to PHQ2 Assessment :691927}  {USE TO PULL IN PHQ RESULTS FOR TODAY:085297}      7/27/2024   Substance Use   Alcohol more than 3/day or more than 7/wk No   Do you use any other substances recreationally? No        Social History     Tobacco Use    Smoking status: Former    Smokeless tobacco: Never   Substance Use Topics    Alcohol use: Yes     Comment: mixed drinks only half a glass    Drug use: Never     {Provider  If there are gaps in the social history shown above, please follow the link to update and then refresh the note Link to Social and Substance History :209307}     {Mammogram Decision Support (Optional):360600}        7/27/2024   STI Screening   New sexual partner(s) since last STI/HIV test? No        History of abnormal Pap smear: { :242338}        Latest Ref Rng & Units 5/23/2024     4:38 PM   PAP / HPV   PAP  Negative for Intraepithelial Lesion or Malignancy (NILM)    HPV 16 DNA Negative Negative    HPV 18 DNA Negative Negative    Other HR HPV Negative Negative      ASCVD Risk   The 10-year ASCVD risk score (Paul DK, et al., 2019) is: 2.3%    Values used to calculate the score:      Age: 53 years      Sex: Female      Is Non- : No      Diabetic: No      Tobacco smoker: No      Systolic Blood Pressure: 133 mmHg      Is BP treated: No      HDL Cholesterol: 64 mg/dL      Total Cholesterol: 244 mg/dL    {Link to Fracture Risk Assessment Tool (Optional):677133}    {Provider  REQUIRED FOR AWV Use the storyboard to review patient history, after sections have been marked as reviewed, refresh note to capture documentation:982441}   Reviewed and updated as needed this visit by Provider                    {HISTORY OPTIONS (Optional):360235}    {ROSE Caceres  "(Optional):151840}     Objective    Exam  There were no vitals taken for this visit.   Estimated body mass index is 38.04 kg/m  as calculated from the following:    Height as of 6/7/24: 1.575 m (5' 2\").    Weight as of 6/7/24: 94.3 kg (208 lb).    Physical Exam  {Exam Choices (Optional):145344}        Signed Electronically by: ISABELLA Smith CNP  {Email feedback regarding this note to [email or web address]   :434614}  "

## 2024-08-01 NOTE — PROGRESS NOTES
"  Assessment & Plan     (E03.8) Subclinical hypothyroidism  Comment: Patient seen in clinic today for follow up regarding subclinical TSH lab.Discussed patient options regarding results. Patient is not having any symptoms.   Plan: TSH Free T4  Pending     (E78.5) Hyperlipidemia, unspecified hyperlipidemia type  Comment: Rechecking lipid labs this visit. Had elevated lipid labs in May. Discussed supplementing with Bergamot.   Plan: Lipid Panel reflex to direct LDL   Pending             BMI  Estimated body mass index is 37.85 kg/m  as calculated from the following:    Height as of this encounter: 1.594 m (5' 2.75\").    Weight as of this encounter: 96.2 kg (212 lb).       Counseling  Appropriate preventive services were addressed with this patient via screening, questionnaire, or discussion as appropriate for fall prevention, nutrition, physical activity, Tobacco-use cessation, weight loss and cognition.  Checklist reviewing preventive services available has been given to the patient.  Reviewed patient's diet, addressing concerns and/or questions.   She is at risk for lack of exercise and has been provided with information to increase physical activity for the benefit of her well-being.   The patient was instructed to see the dentist every 6 months.   She is at risk for psychosocial distress and has been provided with information to reduce risk.       Conrado North is a 53 year old, presenting for the following health issues:  Thyroid Problem        8/1/2024     6:48 AM   Additional Questions   Roomed by Kira HEATON       Follow up on thyroid labs         Review of Systems  Constitutional, HEENT, cardiovascular, pulmonary, gi and gu systems are negative, except as otherwise noted.      Objective    /73 (BP Location: Left arm, Patient Position: Sitting, Cuff Size: Adult Large)   Pulse 82   Temp 97.4  F (36.3  C) (Temporal)   Resp 18   Ht 1.594 m (5' 2.75\")   Wt 96.2 kg (212 lb)   SpO2 97%   BMI " 37.85 kg/m    Body mass index is 37.85 kg/m .  Physical Exam  Constitutional:       Appearance: Normal appearance.   HENT:      Head: Normocephalic.      Nose: Nose normal. No congestion or rhinorrhea.   Eyes:      General:         Right eye: No discharge.         Left eye: No discharge.      Conjunctiva/sclera: Conjunctivae normal.      Pupils: Pupils are equal, round, and reactive to light.   Pulmonary:      Effort: Pulmonary effort is normal. No respiratory distress.      Breath sounds: Normal breath sounds. No stridor. No wheezing or rhonchi.   Musculoskeletal:         General: No swelling, tenderness, deformity or signs of injury. Normal range of motion.      Right lower leg: No edema.      Left lower leg: No edema.   Skin:     General: Skin is warm.      Coloration: Skin is not jaundiced or pale.      Findings: No bruising, erythema, lesion or rash.   Neurological:      General: No focal deficit present.      Mental Status: She is alert and oriented to person, place, and time.   Psychiatric:         Mood and Affect: Mood normal.         Behavior: Behavior normal.         Thought Content: Thought content normal.         Judgment: Judgment normal.            No results found for any visits on 08/01/24.        Signed Electronically by: ISABELLA Smith CNP

## 2024-08-02 DIAGNOSIS — E03.9 HYPOTHYROIDISM, UNSPECIFIED TYPE: Primary | ICD-10-CM

## 2024-08-02 RX ORDER — LEVOTHYROXINE SODIUM 25 UG/1
25 TABLET ORAL DAILY
Qty: 30 TABLET | Refills: 0 | Status: SHIPPED | OUTPATIENT
Start: 2024-08-02 | End: 2024-08-20

## 2024-08-03 NOTE — PROGRESS NOTES
Patient called and updated with lab results, need to start levothyroxine and repeat lab in 8 weeks.

## 2024-08-07 ENCOUNTER — TELEPHONE (OUTPATIENT)
Dept: INTERNAL MEDICINE | Facility: CLINIC | Age: 54
End: 2024-08-07
Payer: COMMERCIAL

## 2024-08-07 NOTE — TELEPHONE ENCOUNTER
General Call      Reason for Call:  Pt has lab appt for 10/11/24    What are your questions or concerns:  Pt would like to know if they should see the provider before the lab to get their TSH and T4 checked or if this should be a lab only appt?    Date of last appointment with provider: 8/1/24    Could we send this information to you in [employer] or would you prefer to receive a phone call?:   Patient would like to be contacted via [employer]

## 2024-08-07 NOTE — TELEPHONE ENCOUNTER
Spoke with patient. Advised that her TSH lab orders are in, and she may make this lab appointment sooner if needed. Otherwise, she is not due for TSH labs until 8/2025 per medical records.    ESTELLE ThomasN RN  [facility]

## 2024-08-20 ENCOUNTER — MYC REFILL (OUTPATIENT)
Dept: INTERNAL MEDICINE | Facility: CLINIC | Age: 54
End: 2024-08-20
Payer: COMMERCIAL

## 2024-08-20 DIAGNOSIS — E03.9 HYPOTHYROIDISM, UNSPECIFIED TYPE: ICD-10-CM

## 2024-08-20 RX ORDER — LEVOTHYROXINE SODIUM 25 UG/1
25 TABLET ORAL DAILY
Qty: 30 TABLET | Refills: 1 | Status: SHIPPED | OUTPATIENT
Start: 2024-08-20

## 2024-10-02 ENCOUNTER — LAB (OUTPATIENT)
Dept: LAB | Facility: CLINIC | Age: 54
End: 2024-10-02
Payer: COMMERCIAL

## 2024-10-02 DIAGNOSIS — E03.9 HYPOTHYROIDISM, UNSPECIFIED TYPE: ICD-10-CM

## 2024-10-02 LAB
T4 FREE SERPL-MCNC: 1.39 NG/DL (ref 0.9–1.7)
TSH SERPL DL<=0.005 MIU/L-ACNC: 0.11 UIU/ML (ref 0.3–4.2)

## 2024-10-02 PROCEDURE — 36415 COLL VENOUS BLD VENIPUNCTURE: CPT

## 2024-10-02 PROCEDURE — 84439 ASSAY OF FREE THYROXINE: CPT

## 2024-10-02 PROCEDURE — 84443 ASSAY THYROID STIM HORMONE: CPT

## 2024-10-08 DIAGNOSIS — E03.9 HYPOTHYROIDISM, UNSPECIFIED TYPE: ICD-10-CM

## 2024-10-08 RX ORDER — LEVOTHYROXINE SODIUM 25 UG/1
TABLET ORAL
Qty: 45 TABLET | Refills: 4 | Status: SHIPPED | OUTPATIENT
Start: 2024-10-08

## 2024-10-08 NOTE — RESULT ENCOUNTER NOTE
Mary Anne,    Your thyroid dose is too high. Reduce to taking the medication every other day. A new prescription has been sent to your pharmacy. Return for recheck of these labs in 6 weeks. Call [phone] or go to [email or web address] for a lab-only appointment.      Jordyn Lopez MD  For Myra [facility]illoenhagen 
POST-OP DIAGNOSIS:  Gangrene of right foot 21-Dec-2021 19:43:32  Jaard Her  Equinus deformity of foot 21-Dec-2021 19:43:43  Jarad Her  
all risk, benefit, alternatives were explained and the patient expressed full understanding.

## 2024-11-25 DIAGNOSIS — E03.8 SUBCLINICAL HYPOTHYROIDISM: Primary | ICD-10-CM

## 2025-01-02 DIAGNOSIS — E03.9 HYPOTHYROIDISM, UNSPECIFIED TYPE: Primary | ICD-10-CM

## 2025-01-02 RX ORDER — LEVOTHYROXINE SODIUM 25 UG/1
TABLET ORAL
Qty: 30 TABLET | Refills: 1 | Status: SHIPPED | OUTPATIENT
Start: 2025-01-02

## 2025-03-04 DIAGNOSIS — E03.9 HYPOTHYROIDISM, UNSPECIFIED TYPE: Primary | ICD-10-CM

## 2025-03-04 RX ORDER — LEVOTHYROXINE SODIUM 25 UG/1
TABLET ORAL
Qty: 30 TABLET | Refills: 0 | Status: SHIPPED | OUTPATIENT
Start: 2025-03-04

## 2025-03-04 NOTE — PROGRESS NOTES
Patient is taking a multi vitamin that does contain biotin 150 mcg discussed taking levothyroxine Monday and Friday and rechecking TSH in 2 months and stopping biotin 2-3 days prior to test. Patient acknowledged and agreed to plan of care.

## 2025-05-12 ENCOUNTER — RESULTS FOLLOW-UP (OUTPATIENT)
Dept: FAMILY MEDICINE | Facility: CLINIC | Age: 55
End: 2025-05-12
Payer: COMMERCIAL

## 2025-05-29 ENCOUNTER — RESULTS FOLLOW-UP (OUTPATIENT)
Dept: FAMILY MEDICINE | Facility: CLINIC | Age: 55
End: 2025-05-29

## 2025-08-14 ENCOUNTER — LAB (OUTPATIENT)
Dept: LAB | Facility: CLINIC | Age: 55
End: 2025-08-14
Payer: COMMERCIAL

## 2025-08-14 DIAGNOSIS — R79.89 LOW TSH LEVEL: ICD-10-CM

## 2025-08-14 LAB
T4 FREE SERPL-MCNC: 1.02 NG/DL (ref 0.9–1.7)
TSH SERPL DL<=0.005 MIU/L-ACNC: 0.01 UIU/ML (ref 0.3–4.2)